# Patient Record
Sex: MALE | Race: WHITE | NOT HISPANIC OR LATINO | URBAN - METROPOLITAN AREA
[De-identification: names, ages, dates, MRNs, and addresses within clinical notes are randomized per-mention and may not be internally consistent; named-entity substitution may affect disease eponyms.]

---

## 2022-08-26 ENCOUNTER — TRANSCRIBE ORDERS (OUTPATIENT)
Dept: PHYSICAL THERAPY | Facility: CLINIC | Age: 77
End: 2022-08-26

## 2022-08-26 DIAGNOSIS — M51.36 DDD (DEGENERATIVE DISC DISEASE), LUMBAR: Primary | ICD-10-CM

## 2022-10-05 ENCOUNTER — TREATMENT (OUTPATIENT)
Dept: PHYSICAL THERAPY | Facility: CLINIC | Age: 77
End: 2022-10-05

## 2022-10-05 DIAGNOSIS — M51.36 DDD (DEGENERATIVE DISC DISEASE), LUMBAR: ICD-10-CM

## 2022-10-05 DIAGNOSIS — G89.29 CHRONIC BILATERAL LOW BACK PAIN WITHOUT SCIATICA: Primary | ICD-10-CM

## 2022-10-05 DIAGNOSIS — Z74.09 IMPAIRED FUNCTIONAL MOBILITY, BALANCE, GAIT, AND ENDURANCE: ICD-10-CM

## 2022-10-05 DIAGNOSIS — M54.50 CHRONIC BILATERAL LOW BACK PAIN WITHOUT SCIATICA: Primary | ICD-10-CM

## 2022-10-05 PROCEDURE — 97162 PT EVAL MOD COMPLEX 30 MIN: CPT | Performed by: PHYSICAL THERAPIST

## 2022-10-05 PROCEDURE — 97110 THERAPEUTIC EXERCISES: CPT | Performed by: PHYSICAL THERAPIST
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
## 2022-10-07 ENCOUNTER — TREATMENT (OUTPATIENT)
Dept: PHYSICAL THERAPY | Facility: CLINIC | Age: 77
End: 2022-10-07

## 2022-10-07 DIAGNOSIS — Z74.09 IMPAIRED FUNCTIONAL MOBILITY, BALANCE, GAIT, AND ENDURANCE: ICD-10-CM

## 2022-10-07 DIAGNOSIS — M51.36 DDD (DEGENERATIVE DISC DISEASE), LUMBAR: ICD-10-CM

## 2022-10-07 DIAGNOSIS — G89.29 CHRONIC BILATERAL LOW BACK PAIN WITHOUT SCIATICA: Primary | ICD-10-CM

## 2022-10-07 DIAGNOSIS — M54.50 CHRONIC BILATERAL LOW BACK PAIN WITHOUT SCIATICA: Primary | ICD-10-CM

## 2022-10-07 PROCEDURE — 97113 AQUATIC THERAPY/EXERCISES: CPT | Performed by: PHYSICAL THERAPIST

## 2022-10-07 NOTE — PROGRESS NOTES
Physical Therapy Daily Treatment Note    Patient: Isauro Sevilla III   : 1945  Diagnosis/ICD-10 Code:  Chronic bilateral low back pain without sciatica [M54.50, G89.29]  Referring practitioner: Sushil Waters MD  Date of Initial Visit: Type: THERAPY  Noted: 10/5/2022  Today's Date: 10/7/2022  Patient seen for 2 sessions             Subjective   I used to walk in the pool for an hour, but haven't been to the pool in a year.    Objective     Water Walk  Deep end X 3 laps Forward and 2 laps Sideways   Tandem Walk across pool in deep end X 2           Stretch Wall Walk alternating knees bent/straight X 1 min.                  Stretch Calf 20 sec                     Stretch --                             Decompression w/ hip/knees bent  X 30 sec.  Abdominals   Large (yellow) foam dumbbell Pushdowns X 15             Shoulder Abd/Add w/ large foam dumbbells X 15                       Hip Abd/Add  15X             Hip Flex/Ext --               March in Place  10X          Sit to Stands X 10 w/o UE assist                B Heel Raises   at 4 ft X 20                    Uni-Clock --                  Step Ups  --                  Bicycle   Seated X 2 min.                                                               Leg press w/ Lg Noodlex 15      Assessment/Plan  First session of aquatic therapy.  Explained expectations from aquatic physical therapy.  Instructed in strengthening and stretching exercises as noted above with verbal cues and demonstration.  No back pain during session.          Timed:  Aquatic Therapy    45     mins 24312;    Francisco Martinez, PT  Physical Therapist    [place] License:  [id number]

## 2022-10-14 ENCOUNTER — TREATMENT (OUTPATIENT)
Dept: PHYSICAL THERAPY | Facility: CLINIC | Age: 77
End: 2022-10-14

## 2022-10-14 DIAGNOSIS — G89.29 CHRONIC BILATERAL LOW BACK PAIN WITHOUT SCIATICA: Primary | ICD-10-CM

## 2022-10-14 DIAGNOSIS — M51.36 DDD (DEGENERATIVE DISC DISEASE), LUMBAR: ICD-10-CM

## 2022-10-14 DIAGNOSIS — M54.50 CHRONIC BILATERAL LOW BACK PAIN WITHOUT SCIATICA: Primary | ICD-10-CM

## 2022-10-14 DIAGNOSIS — Z74.09 IMPAIRED FUNCTIONAL MOBILITY, BALANCE, GAIT, AND ENDURANCE: ICD-10-CM

## 2022-10-14 PROCEDURE — 97113 AQUATIC THERAPY/EXERCISES: CPT | Performed by: PHYSICAL THERAPIST

## 2022-10-14 NOTE — PROGRESS NOTES
Physical Therapy Daily Treatment Note    Patient: Isauro Sevilla III   : 1945  Diagnosis/ICD-10 Code:  Chronic bilateral low back pain without sciatica [M54.50, G89.29]  Referring practitioner: Sushil Waters MD  Date of Initial Visit: Type: THERAPY  Noted: 10/5/2022  Today's Date: 10/14/2022  Patient seen for 3 sessions             Subjective   No problems after first water therapy.    Objective     AQUATIC THERAPY:  Water Walk  From shallow to deep end  Forwards 1 lap and  Sideways 1 lap at end of session  Tandem Walk across pool in deep end X 2  Defer         Stretch Wall Walk alternating knees bent/straight 20 sec                  Stretch Calf 20 sec X 2                                             Decompression w/ hip/knees bent  1 min.  Abdominals   Large (yellow) foam dumbbell Pushdowns X 20             Shoulder Abd/Add w/ large foam dumbbells X20                       Hip Abd/Add  20X             Hip Flex/Ext   15X              March in Place  1 min          Sit to Stands X 12 w/o UE assist                B Heel Raises at 4 ft depth 2  X 20 w/ Calf stretch in between  Rows w/ Hydrotone bells X 20  Hydrotone Stirs  12/12                                Step Ups  --                  Bicycle  --                                                              Leg press w/ Lg foam ring X  20    Assessment/Plan  Reviewed previous exercise with patient and progressed to core stabilization using hydrotone bell resistance as well as hip flexion/extension.  Cues for upright posture and to engage abdominals when appropriate during exercise.  Increased walking challenge by varying depth from shallow to deep.          Timed:  Aquatic Therapy    40     mins 62945;    Francisco Martinez, PT  Physical Therapist    [place] License:  [id number]

## 2022-10-18 ENCOUNTER — TREATMENT (OUTPATIENT)
Dept: PHYSICAL THERAPY | Facility: CLINIC | Age: 77
End: 2022-10-18

## 2022-10-18 DIAGNOSIS — Z74.09 IMPAIRED FUNCTIONAL MOBILITY, BALANCE, GAIT, AND ENDURANCE: ICD-10-CM

## 2022-10-18 DIAGNOSIS — M51.36 DDD (DEGENERATIVE DISC DISEASE), LUMBAR: ICD-10-CM

## 2022-10-18 DIAGNOSIS — G89.29 CHRONIC BILATERAL LOW BACK PAIN WITHOUT SCIATICA: Primary | ICD-10-CM

## 2022-10-18 DIAGNOSIS — M54.50 CHRONIC BILATERAL LOW BACK PAIN WITHOUT SCIATICA: Primary | ICD-10-CM

## 2022-10-18 PROCEDURE — 97113 AQUATIC THERAPY/EXERCISES: CPT | Performed by: PHYSICAL THERAPIST

## 2022-10-18 NOTE — PROGRESS NOTES
Physical Therapy Daily Treatment Note    [facility]  [address] [place] [address]  [phone] (phone)  [phone] (fax)    Patient: Isauro Sevilla III   : 1945  Diagnosis/ICD-10 Code:  Chronic bilateral low back pain without sciatica [M54.50, G89.29]  Referring practitioner: Sushil Waters MD  Date of Initial Visit: Type: THERAPY  Noted: 10/5/2022  Today's Date: 10/18/2022  Patient seen for 4 sessions             Subjective   Patient reports the water has felt very good to him thus far.     Objective     AQUATIC THERAPY:  Water Walk  From shallow to deep end  Forwards 1 lap and  Sideways 1 lap at end of session  Tandem Walk across pool in deep end X 2  Defer         Stretch Wall Walk alternating knees bent/straight 20 sec                  Stretch Calf 20 sec X 2                                             Decompression w/ hip/knees bent  1 min.  Abdominals   Large (yellow) foam dumbbell Pushdowns X 20             Shoulder Abd/Add w/ large foam dumbbells X20                       Hip Abd/Add  20X             Hip Flex/Ext   15X            Hip Circles CW/CCW 10/10    March in Place  1 min   Leg Press Large noodle x20 B         Sit to Stands X 10 w/o UE assist                B Heel Raises *defer  Rows w/ Hydrotone bells X 1 min  Hydrotone Stirs  20/20                                Step Ups  --                  Bicycle  with LN suspended 3 min                 Suspended tuck ups x 20                                          Assessment/Plan  Patient was able to perform UE exercises in deep end of pool without compensation at shoulders. Some excessive activation from hip flexors noted with hip abduction kicks.  Reminded patient to maintain upright posture with all standing hip exercises. Also added bicycle kicks and tuck ups to try and increase core strength/stability         Timed:  Aquatic Therapy    25     mins 37924;  Total Treatment   30    mins    Ania  Kathy PT  Physical Therapist    [place] License: [id number]

## 2022-10-20 ENCOUNTER — TREATMENT (OUTPATIENT)
Dept: PHYSICAL THERAPY | Facility: CLINIC | Age: 77
End: 2022-10-20

## 2022-10-20 DIAGNOSIS — Z74.09 IMPAIRED FUNCTIONAL MOBILITY, BALANCE, GAIT, AND ENDURANCE: ICD-10-CM

## 2022-10-20 DIAGNOSIS — M54.50 CHRONIC BILATERAL LOW BACK PAIN WITHOUT SCIATICA: Primary | ICD-10-CM

## 2022-10-20 DIAGNOSIS — M51.36 DDD (DEGENERATIVE DISC DISEASE), LUMBAR: ICD-10-CM

## 2022-10-20 DIAGNOSIS — G89.29 CHRONIC BILATERAL LOW BACK PAIN WITHOUT SCIATICA: Primary | ICD-10-CM

## 2022-10-20 PROCEDURE — 97113 AQUATIC THERAPY/EXERCISES: CPT | Performed by: PHYSICAL THERAPIST

## 2022-10-20 NOTE — PROGRESS NOTES
Physical Therapy Daily Progress Note    Patient: Isauro Sevilla III   : 1945  Diagnosis/ICD-10 Code:  Chronic bilateral low back pain without sciatica [M54.50, G89.29]  Referring practitioner: Sushil Waters MD  Date of Initial Visit: Type: THERAPY  Noted: 10/5/2022  Today's Date: 10/20/2022  Patient seen for 5 sessions             Subjective Evaluation    History of Present Illness    Subjective comment: I was tired after  session but I'm not complaining, it was a good workout.         Objective     Water Walk shallow to deep end:  Forward, sideways, and marching x 1 lap ea  Tandem Walk across pool in deep end x 2       Stretch Wall Walk alternating knees bent/straight 20 sec - on own prn  Stretch Calf 20 sec x 2  Decompression w/ hip/knees bent  1 min - on own  Abdominals   Large (yellow) foam dumbbell Pushdowns x 20  Shoulder Abd/Add w/ large foam dumbbells x 20  Hip Abd/Add  20x  Hip Flex/Ext   15x  Hip Circles CW/CCW 15/15  March in Place  1 min - *deferred  Leg Press Large noodle x 25 B  Sit to Stands x 10 w/o UE assist  B Heel Raises *defer  Rows w/ Hydrotone bells x 1 min  Hydrotone Stirs  20/20  Step Ups  --  Bicycle  with LN suspended 3 min - on own  Suspended tuck ups x 20        Assessment & Plan     Assessment    Assessment details: Patient reports being tired after last session but felt it was a good thing.  Contrinued with previous aquatic ex/activity for mobility, flexibility, strength/stabilization, and balance.  Increased reps on a couple of ex and added march walking this visit.  Frequent reminders to stand tall and engage abdominals during water walking, standing LE ex. Instructed to reduce ROM and focus on controlled movement with hip kicks to minimize trunk compensation.  PT provided demonstration and cuing throughout session for optimal posture, core/glut activation, and correct form/technique with ex/activity.    Plan: Continue to aAssess patient response to aquatic  ex/activity and modify/progress as appropriate.                  Timed:  Aquatic Therapy    32     mins [id number];    Gisela Craig, PT  Physical Therapist    [place] License: [id number]

## 2022-10-24 ENCOUNTER — TREATMENT (OUTPATIENT)
Dept: PHYSICAL THERAPY | Facility: CLINIC | Age: 77
End: 2022-10-24

## 2022-10-24 DIAGNOSIS — M54.50 CHRONIC BILATERAL LOW BACK PAIN WITHOUT SCIATICA: Primary | ICD-10-CM

## 2022-10-24 DIAGNOSIS — M51.36 DDD (DEGENERATIVE DISC DISEASE), LUMBAR: ICD-10-CM

## 2022-10-24 DIAGNOSIS — Z74.09 IMPAIRED FUNCTIONAL MOBILITY, BALANCE, GAIT, AND ENDURANCE: ICD-10-CM

## 2022-10-24 DIAGNOSIS — G89.29 CHRONIC BILATERAL LOW BACK PAIN WITHOUT SCIATICA: Primary | ICD-10-CM

## 2022-10-24 PROCEDURE — 97113 AQUATIC THERAPY/EXERCISES: CPT | Performed by: PHYSICAL THERAPIST

## 2022-10-24 NOTE — PROGRESS NOTES
Physical Therapy Daily Progress Note    Patient: Isauro Sevilla III   : 1945  Diagnosis/ICD-10 Code:  Chronic bilateral low back pain without sciatica [M54.50, G89.29]  Referring practitioner: Sushil Waters MD  Date of Initial Visit: Type: THERAPY  Noted: 10/5/2022  Today's Date: 10/24/2022  Patient seen for 6 sessions             Subjective Evaluation    History of Present Illness    Subjective comment: I was kind of tired after last time but seemed llike I got a good workout.  My back's been doing okay but it was a little more sore after last visit -, thinking maybe I overstretched.         Objective     Water Walk shallow to deep end:  Forward, sideways, and marching x 2 laps ea, emphasis on upright posture (noodle support for march walking)  Tandem Walk across pool in deep end x 2, noodle support       Stretch Wall Walk alternating knees bent/straight 20 sec - on own prn  Stretch Calf 20 sec x 2  Decompression w/ hip/knees bent  1 min - on own  Abdominals   Large (yellow) foam dumbbell Pushdowns x 20  Shoulder Abd/Add w/ large foam dumbbells x 20  Rows w/ Hydrotone bells x 1 min  Hydrotone Stirs  20/20  KB push/pulls x 20  Mini Squats x 15  Hip Abd/Add  20x  Hip Flex/Ext   20x  Hip Circles CW/CCW 15/15  March in Place  1 min - *deferred  Leg Press Large noodle x 25 B  B Heel Raises x 20  Step Ups  --  Bicycle  with LN suspended 3 min  Suspended tuck ups x 20      Sit to Stands x 10 w/o UE assist    Assessment & Plan     Assessment    Assessment details: Patient reports being a little tired after last session but believes it was a good workout.  Contrinued with most previous aquatic ex/activity for mobility, flexibility, strength/stabilization, and balance.  Increased reps on a couple of ex and added mini squats, KB push/pulls this visit.  Emphasis on maintaining upright posture and not slouching during WB ex/activity.  Encouraged him to focus on engaging core and keeping ex performance in comfortable  range to help minimize strain on spine.  He did use noodle support for march/tandem walking to help with balance/stability.  Demonstration and cuing provided throughout session for optimal posture, core/glut activation, and correct form/technique with ex/activity.    Plan: Continue with aquatic ex/activity progressing as appropriate/tolerated.                 Timed:  Aquatic Therapy    39     mins 22862;    Gisela Craig PT  Physical Therapist    [place] License: [id number]

## 2022-11-04 ENCOUNTER — TREATMENT (OUTPATIENT)
Dept: PHYSICAL THERAPY | Facility: CLINIC | Age: 77
End: 2022-11-04

## 2022-11-04 DIAGNOSIS — Z74.09 IMPAIRED FUNCTIONAL MOBILITY, BALANCE, GAIT, AND ENDURANCE: ICD-10-CM

## 2022-11-04 DIAGNOSIS — M51.36 DDD (DEGENERATIVE DISC DISEASE), LUMBAR: ICD-10-CM

## 2022-11-04 DIAGNOSIS — G89.29 CHRONIC BILATERAL LOW BACK PAIN WITHOUT SCIATICA: Primary | ICD-10-CM

## 2022-11-04 DIAGNOSIS — M54.50 CHRONIC BILATERAL LOW BACK PAIN WITHOUT SCIATICA: Primary | ICD-10-CM

## 2022-11-04 PROCEDURE — 97113 AQUATIC THERAPY/EXERCISES: CPT | Performed by: PHYSICAL THERAPIST

## 2022-11-04 NOTE — PROGRESS NOTES
"Physical Therapy 30-Day Progress Note     [facility]  [address] [place] [address]  [phone] (phone)  [phone] (fax)    Patient: Isauro Sevilla III   : 1945  Diagnosis/ICD-10 Code:  Chronic bilateral low back pain without sciatica [M54.50, G89.29]  Referring practitioner: Sushil Waters MD  Date of Initial Visit: Type: THERAPY  Noted: 10/5/2022  Today's Date: 2022  Patient seen for 7 sessions      Subjective:     Clinical Progress: improved  Home Program Compliance: Yes  Treatment has included:  aquatic therapy and patient education with home exercise program     Subjective  Pain  Current  3 /10, Peak pain /10  Getting out of bed \"wrong\".   Standing and walking aggravate his back pain.  I've been working on weight management and lost 20 pounds.      Objective       Functional outcome score: Oswestry 58%    Strength/Myotome Testing      Left Hip   Planes of Motion   Flexion  5/5  External rotation: 5/5     Right Hip   Planes of Motion   Flexion: 5/5  External rotation: 5/5     Left Knee   Flexion: 5  Extension: 4     Right Knee   Flexion: 4  Extension: 4     Left Ankle/Foot   Dorsiflexion: 5  Plantar flexion: 4     Right Ankle/Foot   Dorsiflexion: 5  Plantar flexion: 4    AQUATIC EXERCISE:    Water Walk shallow to deep end:  Forward, sideways and marching x 2 laps ea,  (noodle support for march walking)  Tandem Walk across pool in deep end x 2, noodle support  Deferred     Stretch Wall Walk alternating knees bent/straight 20 sec X 2  Stretch Calf 20 sec x 2  Abdominals   Large (yellow) foam dumbbell Pushdowns x 20  Shoulder Abd/Add w/ large foam dumbbells x 20  Rows w/ Hydrotone bells x 15  Hydrotone Stirs  10X  KB push/pulls x 20  Deferred  Mini Squats x 15  Hip Abd/Add  20x  Hip Flex/Ext   20x  Deferred  Hip Circles CW/CCW 15/15  Deferred  Leg Press Large noodle x 20   B Heel Raises x 20  Step Ups  8 inch  X 10  Bicycle  with LN suspended 3 " "min   Sit to Stands x 10 w/o UE assist    Education in bed mobility with demonstration.    Assessment/Plan        Isauro Sevilla III is a 77 y.o. year-old male referred to physical therapy for back pain  He has comorbidities of DDD, L4 disc bulge, R knee pain.  \"Chon\" has been treated in aquatic therapy for 6 sessions today.  He missed an appointment last week due to seeing the Dr for breathing issues related to his COPD.  His LE strength is improving and his pain ratings are decreasing.  He continues to have difficulty with standing and walking due to back pain and weakness.  Chon has been working on weight management and has lost 20 pounds.  He has met all the short term goals as well as one of five long term goals.  Chon is motivated to improve and appropriate for continued skilled physical therapy.     Goals: STGs:   1. Patient will be independent with education for symptom management, joint protection and strategies to minimize stress on affected tissues. MET    2. Pt is able to tolerate 30 min of aquatic therapy with reports of reduced pain levels after treatment for 2-3 hours.  MET    3. Pt to improve pain complaints to no more than 6/10 with ADLs and walking.  MET    LT wks  1. Pt to report no more than 4/10 pain in the LB with ADLs and walking.  ONGOING  2. Pt to improve trunk and LE strength by 1/2 to 1 MMT grade. PARTIALLY MET  3. Pt able to exit the pool on the stairs using a reciprocal pattern.  MET  4. Pt to improve Oswestry Back index score from 60% to 46% for overall functional improvement. ONGOING 58%  5. Pt to be independent with progressive HEP for core and LE strength. ONGOING           Recommendations: Continue as planned  Timeframe: 2 months  Prognosis to achieve goals: good    PT Signature: Francisco Martinez, PT  [place] License: [id number]      Based upon review of the patient's progress and continued therapy plan, it is my medical opinion that Isauro Sevilla III should continue physical therapy " treatment at [facility]AP[facility]  [address] [place] [id number] [phone].    Signature: __________________________________  Sushil Waters  NPI: [id number]                                          Timed:  Aquatic therapy  48517    45   mins

## 2022-11-09 ENCOUNTER — TREATMENT (OUTPATIENT)
Dept: PHYSICAL THERAPY | Facility: CLINIC | Age: 77
End: 2022-11-09

## 2022-11-09 DIAGNOSIS — G89.29 CHRONIC BILATERAL LOW BACK PAIN WITHOUT SCIATICA: Primary | ICD-10-CM

## 2022-11-09 DIAGNOSIS — M51.36 DDD (DEGENERATIVE DISC DISEASE), LUMBAR: ICD-10-CM

## 2022-11-09 DIAGNOSIS — Z74.09 IMPAIRED FUNCTIONAL MOBILITY, BALANCE, GAIT, AND ENDURANCE: ICD-10-CM

## 2022-11-09 DIAGNOSIS — M54.50 CHRONIC BILATERAL LOW BACK PAIN WITHOUT SCIATICA: Primary | ICD-10-CM

## 2022-11-09 PROCEDURE — 97113 AQUATIC THERAPY/EXERCISES: CPT | Performed by: PHYSICAL THERAPIST

## 2022-11-09 NOTE — PROGRESS NOTES
Physical Therapy Daily Treatment Note    [facility] Physical Therapy Milestone  [address] [place] [address]  [phone] (phone)  [phone] (fax)    Patient: Isauro Sevilla III   : 1945  Diagnosis/ICD-10 Code:  Chronic bilateral low back pain without sciatica [M54.50, G89.29]  Referring practitioner: Sushil Waters MD  Date of Initial Visit: Type: THERAPY  Noted: 10/5/2022  Today's Date: 2022  Patient seen for 8 sessions             Subjective   My back was doing okay until I got up this morning.  Feeling some better now.    Objective     AQUATIC EXERCISE:   Water Walk shallow to deep end:  Forwards and sideways Independent  March Walk w/ Noodle support  Deferred  Tandem Walk across pool in deep end w/ noodle support 15 ft X 4, then w/o noodle 15 ft X 2     Stretch Wall Walk alternating knees bent/straight 20 sec X 2  Stretch Calf 20 sec x 2  Abdominals   Large (yellow) foam dumbbell Pushdowns x 20  Shoulder Abd/Add w/ large foam dumbbells x 20  Rows w/ Hydrotone bells x 15  Hydrotone Stirs  15/15  Mini Squats x 15  Hip Abd/Add  20x  Hip Flex/Ext   20x    Hip Circles CW/CCW 15/15    Leg Press Solid (white) noodle x 20 Deep  B Heel Raises 2 X 10 shallow  Step Ups  8 inch  X 10  Deferred  Bicycle  with LN suspended 3 min Independent  Sit to Stands x 10 w/o UE assist      Assessment/Plan  Used written instructions with pictures of aquatic exercises this visit to assist with patient becoming more independent as he would like to start coming to the pool 1 day a week on his own to supplement his therapy.  He is still concerned about his balance with walking and continues to report intermittent back pain that also limits his ability to walk longer distances.          Timed:  Aquatic Therapy    40     mins [id number];    Francisco Martinez, PT  Physical Therapist    [place] License:  [id number]

## 2022-11-11 ENCOUNTER — TREATMENT (OUTPATIENT)
Dept: PHYSICAL THERAPY | Facility: CLINIC | Age: 77
End: 2022-11-11

## 2022-11-11 DIAGNOSIS — M51.36 DDD (DEGENERATIVE DISC DISEASE), LUMBAR: ICD-10-CM

## 2022-11-11 DIAGNOSIS — G89.29 CHRONIC BILATERAL LOW BACK PAIN WITHOUT SCIATICA: Primary | ICD-10-CM

## 2022-11-11 DIAGNOSIS — Z74.09 IMPAIRED FUNCTIONAL MOBILITY, BALANCE, GAIT, AND ENDURANCE: ICD-10-CM

## 2022-11-11 DIAGNOSIS — M54.50 CHRONIC BILATERAL LOW BACK PAIN WITHOUT SCIATICA: Primary | ICD-10-CM

## 2022-11-11 PROCEDURE — 97113 AQUATIC THERAPY/EXERCISES: CPT | Performed by: PHYSICAL THERAPIST

## 2022-11-11 NOTE — PROGRESS NOTES
Physical Therapy Daily Treatment Note    [facility]  [address] [place] [address]  [phone] (phone)  [phone] (fax)    Patient: Isauro Sevilla III   : 1945  Diagnosis/ICD-10 Code:  Chronic bilateral low back pain without sciatica [M54.50, G89.29]  Referring practitioner: Sushil Waters MD  Date of Initial Visit: Type: THERAPY  Noted: 10/5/2022  Today's Date: 2022  Patient seen for 9 sessions             Subjective   I had that one bad day with my back.    Objective     AQUATIC EXERCISE:  Water walking Forwards - Independent  March Walk w/o Noodle support  25 ft X 4  Tandem Walk at 4 ft depth w/o noodle 25 ft X 4  Stretch Wall Walk alternating knees bent/straight 20 sec X 2  Stretch Calf 20 sec x 2  Defer  Abdominals   Large (yellow) foam dumbbell Pushdowns x 20  Shoulder Abd/Add w/ large foam dumbbells x 20  Rows w/ Hydrotone bells x 15  Hydrotone Stirs  15/15   Defer  Mini Squats x 15  Defer  Hip Abd/Add  20x  Defer  Hip Flex/Ext   20x  Defer  Hip Circles CW/CCW 15/15  Defer  Leg Press Solid (white) noodle x 20 Deep Defer  B Heel Raises 2 X 10 shallow  Defer  Step Ups  8 inch  X 10    Bicycle  with LN suspended 3 min Independent  Sit to Stands x 10 w/o UE assist Defer    Assessment/Plan  Patient arrived at 3:45 for a 3:15 appointment - got times mixed up.  Able to accommodate for a shortened session, therefore many exercises were deferred.  He reports his back is feeling better.  He demonstrates improved balance with tandem walk and march walk - now able to perform without noodle assist and in shallower depth than previously.            Timed:  Aquatic Therapy    12     mins [id number];    Francisco Martinez, PT  Physical Therapist    [place] License:  [id number]

## 2022-11-16 ENCOUNTER — TREATMENT (OUTPATIENT)
Dept: PHYSICAL THERAPY | Facility: CLINIC | Age: 77
End: 2022-11-16

## 2022-11-16 DIAGNOSIS — G89.29 CHRONIC BILATERAL LOW BACK PAIN WITHOUT SCIATICA: Primary | ICD-10-CM

## 2022-11-16 DIAGNOSIS — M51.36 DDD (DEGENERATIVE DISC DISEASE), LUMBAR: ICD-10-CM

## 2022-11-16 DIAGNOSIS — M54.50 CHRONIC BILATERAL LOW BACK PAIN WITHOUT SCIATICA: Primary | ICD-10-CM

## 2022-11-16 DIAGNOSIS — Z74.09 IMPAIRED FUNCTIONAL MOBILITY, BALANCE, GAIT, AND ENDURANCE: ICD-10-CM

## 2022-11-16 PROCEDURE — 97113 AQUATIC THERAPY/EXERCISES: CPT | Performed by: PHYSICAL THERAPIST

## 2022-11-16 NOTE — PROGRESS NOTES
Physical Therapy Daily Treatment Note    [facility] Physical Therapy Milestone  [address] [place] [address]  [phone] (phone)  [phone] (fax)    Patient: Isauro Sevilla III   : 1945  Diagnosis/ICD-10 Code:  Chronic bilateral low back pain without sciatica [M54.50, G89.29]  Referring practitioner: Sushil Waters MD  Date of Initial Visit: Type: THERAPY  Noted: 10/5/2022  Today's Date: 2022  Patient seen for 10 sessions             Subjective   I don't sleep well and haven't for years, had a recent sleep apnea test and I don't have sleep apnea.  I have trouble getting to sleep and then when sleeping I get woke up when I turn over from pain.    Objective     AQUATIC EXERCISE:  Water walking Forwards - Independent  March Walk   25 ft X 2  Tandem Walk at 4 ft depth w/o noodle 25 ft X 4  Stretch Wall Walk alternating knees bent/straight 20 sec X 2  Stretch Calf 20 sec x 2    Abdominals   Large (yellow) foam dumbbell Pushdowns x 20  Shoulder Abd/Add w/ large foam dumbbells x 20  Rows w/ Hydrotone bells x 15  Hydrotone Stirs  15/15    Mini Squats x 15    Hip Abd/Add  20x    Hip Flex/Ext   20X    Hip Circles CW/CCW 15/15   Leg Press Solid (white) noodle x 20 Deep   B Heel Raises 2 X 10 shallow   Step Ups  8 inch  X 10    Bicycle  with LN suspended Independent         Assessment/Plan  Patient reports jarring in his left LE when walking.  He uses a cane in his left hand.  He was advised to use the cane in his right hand to offset weight bearing on the left. Demonstrated bed mobility as patient reports left shoulder pain getting in/out.        Timed:  Aquatic Therapy    45     mins [id number];    Francisco Martinez, PT  Physical Therapist    [place] License:  [id number]

## 2022-11-18 ENCOUNTER — TREATMENT (OUTPATIENT)
Dept: PHYSICAL THERAPY | Facility: CLINIC | Age: 77
End: 2022-11-18

## 2022-11-18 DIAGNOSIS — Z74.09 IMPAIRED FUNCTIONAL MOBILITY, BALANCE, GAIT, AND ENDURANCE: ICD-10-CM

## 2022-11-18 DIAGNOSIS — M51.36 DDD (DEGENERATIVE DISC DISEASE), LUMBAR: ICD-10-CM

## 2022-11-18 DIAGNOSIS — M54.50 CHRONIC BILATERAL LOW BACK PAIN WITHOUT SCIATICA: Primary | ICD-10-CM

## 2022-11-18 DIAGNOSIS — G89.29 CHRONIC BILATERAL LOW BACK PAIN WITHOUT SCIATICA: Primary | ICD-10-CM

## 2022-11-18 PROCEDURE — 97113 AQUATIC THERAPY/EXERCISES: CPT | Performed by: PHYSICAL THERAPIST

## 2022-11-18 NOTE — PROGRESS NOTES
Physical Therapy Daily Treatment Note    [facility] Physical Therapy Milestone  [address] [place] [address]  [phone] (phone)  [phone] (fax)    Patient: Isauro Sevilla III   : 1945  Diagnosis/ICD-10 Code:  Chronic bilateral low back pain without sciatica [M54.50, G89.29]  Referring practitioner: Sushil Waters MD  Date of Initial Visit: Type: THERAPY  Noted: 10/5/2022  Today's Date: 2022  Patient seen for 11 sessions             Subjective   My back is feeling better, I still go to the chiropractor every other week.  Having trouble sleeping the last 2 months.    Objective     AQUATIC EXERCISE:  Water walking Forwards - Independent prior to appt.  March Walk   25 ft X 2  Tandem Walk at 4 ft depth w/o noodle 25 ft X 4- Deferred  Stretch Wall Walk alternating knees bent/straight 20 sec X 2  Stretch Calf 20 sec x 2    Abdominals   Large (yellow) foam dumbbell Pushdowns x 20 -Try blue next  Shoulder Abd/Add w/ large foam dumbbells x 20 - Try blue next  Rows w/ Hydrotone bells x 15  Hydrotone Stirs  15/15    Mini Squats x 15    Hip Abd/Add  20x    Hip Flex/Ext   20X    Hip Circles CW/CCW 15/15   Leg Press Solid (white) noodle x 20 Deep   B Heel Raises 2 X 10 shallow   Step Ups  8 inch  X 10    Bicycle  with LN suspended- Independent    Assessment/Plan  Recommended patient consult his PCP regarding sleep problems.  Urged patient to exercise 1 day/week at the pool on his own in addition to physical therapy 2 days/week.   Needs correction on proper technique with a few exercises, guidance for optimal water depth for each exercise.  Plan: Progress to larger blue foam dumbbells to increase resistance next visit.        Timed:  Aquatic Therapy    39     mins 05150;    Francisco Martinez, PT  Physical Therapist    [place] License:  [id number]

## 2022-11-21 ENCOUNTER — TREATMENT (OUTPATIENT)
Dept: PHYSICAL THERAPY | Facility: CLINIC | Age: 77
End: 2022-11-21

## 2022-11-21 DIAGNOSIS — Z74.09 IMPAIRED FUNCTIONAL MOBILITY, BALANCE, GAIT, AND ENDURANCE: ICD-10-CM

## 2022-11-21 DIAGNOSIS — M51.36 DDD (DEGENERATIVE DISC DISEASE), LUMBAR: ICD-10-CM

## 2022-11-21 DIAGNOSIS — G89.29 CHRONIC BILATERAL LOW BACK PAIN WITHOUT SCIATICA: Primary | ICD-10-CM

## 2022-11-21 DIAGNOSIS — M54.50 CHRONIC BILATERAL LOW BACK PAIN WITHOUT SCIATICA: Primary | ICD-10-CM

## 2022-11-21 PROCEDURE — 97113 AQUATIC THERAPY/EXERCISES: CPT | Performed by: PHYSICAL THERAPIST

## 2022-11-21 NOTE — PROGRESS NOTES
"Physical Therapy Daily Treatment Note    [facility] Physical Therapy Milestone  [address] [place] [address]  [phone] (phone)  [phone] (fax)    Patient: Isauro Sevilla III   : 1945  Diagnosis/ICD-10 Code:  Chronic bilateral low back pain without sciatica [M54.50, G89.29]  Referring practitioner: Sushil Waters MD  Date of Initial Visit: Type: THERAPY  Noted: 10/5/2022  Today's Date: 2022  Patient seen for 12 sessions             Subjective   Going to see my asthma Dr tomorrow to see about my breathing when I'm trying to sleep.      Objective     AQUATIC EXERCISE:  Water walking Forwards - Independent prior to appt.  March Walk   25 ft X 4  Tandem Walk at 4 ft depth w/o noodle 25 ft X 4  Stretch Wall Walk alternating knees bent/straight 20 sec X 2  Stretch Calf 20 sec x 2    Abdominals Xtra  Large (blue) foam dumbbell Pushdowns x 20 (4 ft)  Shoulder Abd/Add w/ Xtra Large foam dumbbells x 20 (4 ft)  Rows w/ closed paddles x 15  Paddle Stirs  15/15    Sit to Stand from pool bench seat X 10 w/o hands  Uni Clock X 10  Shallow  Mini Squats x 15    Hip Abd/Add  20x    Hip Flex/Ext   20X    Hip Circles CW/CCW 15/15   Leg Press Solid (white) noodle x 20 Deep   B Heel Raises 20X shallow   Step Ups  8 inch  X 10  Deferred  Bicycle  with LN suspended- Independent      Assessment/Plan  Due to left shoulder pain reduced resistance with Rows and \"Stir the pot\" from larger hydrotone bells to smaller paddles.  Patient was able to perform sit to stands from pool bench without hand support with greater ease than a month ago.  He was also able to increase water walking to 6 minutes without break.          Timed:  Aquatic Therapy    38     mins [id number];    Francisco Martinez, PT  Physical Therapist    [place] License:  [id number]  "

## 2022-11-23 ENCOUNTER — TREATMENT (OUTPATIENT)
Dept: PHYSICAL THERAPY | Facility: CLINIC | Age: 77
End: 2022-11-23

## 2022-11-23 DIAGNOSIS — G89.29 CHRONIC BILATERAL LOW BACK PAIN WITHOUT SCIATICA: Primary | ICD-10-CM

## 2022-11-23 DIAGNOSIS — M54.50 CHRONIC BILATERAL LOW BACK PAIN WITHOUT SCIATICA: Primary | ICD-10-CM

## 2022-11-23 DIAGNOSIS — M51.36 DDD (DEGENERATIVE DISC DISEASE), LUMBAR: ICD-10-CM

## 2022-11-23 DIAGNOSIS — Z74.09 IMPAIRED FUNCTIONAL MOBILITY, BALANCE, GAIT, AND ENDURANCE: ICD-10-CM

## 2022-11-23 PROCEDURE — 97113 AQUATIC THERAPY/EXERCISES: CPT | Performed by: PHYSICAL THERAPIST

## 2022-11-23 NOTE — PROGRESS NOTES
Physical Therapy Daily Treatment Note    [facility] Physical Therapy Milestone  [address] [place] [address]  [phone] (phone)  [phone] (fax)    Patient: Isauro Sevilla III   : 1945  Diagnosis/ICD-10 Code:  Chronic bilateral low back pain without sciatica [M54.50, G89.29]  Referring practitioner: Sushil Waters MD  Date of Initial Visit: Type: THERAPY  Noted: 10/5/2022  Today's Date: 2022  Patient seen for 13 sessions             Subjective   Sleeping better, Dr reduced some of his OTC medicines and the inhaler. Slept 7 hours last night.    Objective     AQUATIC EXERCISE:  Water walking Forwards - Independent prior to appt.  Tandem Walk at 4 ft depth 25 ft X 4  Stretch Wall Walk alternating knees bent/straight 20 sec X 2  Stretch Calf 20 sec x 2    Abdominals Xtra  Large (blue) foam dumbbell Pushdowns x 20 (4 ft)  Shoulder Abd/Add w/ Xtra Large foam dumbbells x 20 (4 ft)  Rows w/ closed paddles x 15  Paddle Stirs  15/15    Sit to Stand from pool bench seat X 10 w/o hands  Uni Clock X 10  Shallow  Defer  Mini Squats x 15    Hip Abd/Add  20x  On own  Hip Flex/Ext   20X    Hip Circles CW/CCW 15/15   B Heel Raises 20X shallow   Step Ups  6 inch  X 15  Piriformis stretch 20 sec X 2  Leg Press Solid (white) noodle x 20 Deep on own  Bicycle  with LN suspended- Independent         Assessment/Plan  Changed from deeper water 8 inch step up to shallow water 6 inch step up, Chon has tendency to pull self up with his hand on the railing, cues to use rail for balance.  Demonstration provided for sit to stand transfer technique on dry land as Chon has difficulty with balance upon standing.        Timed:  Aquatic Therapy    30     mins 69901;    Francisco Martinez, PT  Physical Therapist    [place] License:  [id number]

## 2022-11-28 ENCOUNTER — TREATMENT (OUTPATIENT)
Dept: PHYSICAL THERAPY | Facility: CLINIC | Age: 77
End: 2022-11-28

## 2022-11-28 DIAGNOSIS — M54.50 CHRONIC BILATERAL LOW BACK PAIN WITHOUT SCIATICA: Primary | ICD-10-CM

## 2022-11-28 DIAGNOSIS — Z74.09 IMPAIRED FUNCTIONAL MOBILITY, BALANCE, GAIT, AND ENDURANCE: ICD-10-CM

## 2022-11-28 DIAGNOSIS — G89.29 CHRONIC BILATERAL LOW BACK PAIN WITHOUT SCIATICA: Primary | ICD-10-CM

## 2022-11-28 DIAGNOSIS — M51.36 DDD (DEGENERATIVE DISC DISEASE), LUMBAR: ICD-10-CM

## 2022-11-28 PROCEDURE — 97113 AQUATIC THERAPY/EXERCISES: CPT | Performed by: PHYSICAL THERAPIST

## 2022-11-28 NOTE — PROGRESS NOTES
Physical Therapy Daily Treatment Note    [facility] Physical Therapy Milestone  [address] [place] [address]  [phone] (phone)  [phone] (fax)    Patient: Isauro Sevilla III   : 1945  Diagnosis/ICD-10 Code:  Chronic bilateral low back pain without sciatica [M54.50, G89.29]  Referring practitioner: Sushil Waters MD  Date of Initial Visit: Type: THERAPY  Noted: 10/5/2022  Today's Date: 2022  Patient seen for 14 sessions             Subjective   Back to having trouble getting to sleep and trouble with breathing while sleeping - having a sleep apnea test. Came to the pool  on my own and did too much and that made my back hurt.    Objective     AQUATIC EXERCISE:  Water walking Forwards - Independent prior to appt.  Tandem Walk at 4 ft depth 25 ft X 4  Stretch Wall Walk alternating knees bent/straight 20 sec X 2  Stretch Calf 20 sec x 2    Abdominals  Large (yellow) foam dumbbell Pushdowns x 20   Shoulder Abd/Add w/ Large foam dumbbells x 20   Rows w/ closed paddles x 15  Paddle Stirs  --   Sit to Stand from pool bench seat X 10 w/o hands Defer  Uni Clock X 10  Shallow  Defer  Mini Squats x 15    Hip Abd/Add  20x   Hip Flex/Ext   20X    Hip Circles CW/CCW 15/15   B Heel Raises 20X shallow   Step Ups  8 inch  X 15 at waist depth  Piriformis stretch 20 sec X 2  Leg Press Solid (white) noodle x 20 Deep   Bicycle  with LN suspended- Independent    Assessment/Plan  Patient came to pool on his own yesterday and doubled the exercises which resulted in increased back pain.  Education on dosage of exercise.  Patient is being scheduled for sleep apnea test. - Having trouble with sleeping again.  He opted to go back to the yellow foam dumbbells and the smaller blue paddles for core work due to left shoulder irritation.  Progressed from 6 inch  to 8 inch step ups.          Timed:  Aquatic Therapy    40     mins 22160;    Francisco Martinez, PT  Physical Therapist    [place] License:   [id number]

## 2022-11-30 ENCOUNTER — TREATMENT (OUTPATIENT)
Dept: PHYSICAL THERAPY | Facility: CLINIC | Age: 77
End: 2022-11-30

## 2022-11-30 DIAGNOSIS — M54.50 CHRONIC BILATERAL LOW BACK PAIN WITHOUT SCIATICA: Primary | ICD-10-CM

## 2022-11-30 DIAGNOSIS — G89.29 CHRONIC BILATERAL LOW BACK PAIN WITHOUT SCIATICA: Primary | ICD-10-CM

## 2022-11-30 DIAGNOSIS — M51.36 DDD (DEGENERATIVE DISC DISEASE), LUMBAR: ICD-10-CM

## 2022-11-30 DIAGNOSIS — Z74.09 IMPAIRED FUNCTIONAL MOBILITY, BALANCE, GAIT, AND ENDURANCE: ICD-10-CM

## 2022-11-30 PROCEDURE — 97113 AQUATIC THERAPY/EXERCISES: CPT | Performed by: PHYSICAL THERAPIST

## 2022-11-30 NOTE — PROGRESS NOTES
Physical Therapy Daily Treatment Note    [facility] Physical Therapy Milestone  [address] [place], [place] [address] [phone] (phone)  [phone] (fax)    Patient: Isauro Sevilla III   : 1945  Diagnosis/ICD-10 Code:  Chronic bilateral low back pain without sciatica [M54.50, G89.29]  Referring practitioner: Sushil Waters MD  Date of Initial Visit: Type: THERAPY  Noted: 10/5/2022  Today's Date: 2022  Patient seen for 15 sessions             Subjective   My balance is getting better, but I still need my cane.    Objective     AQUATIC THERAPY:  Water walk: moved to just below 4 ft depth, 25 ft X 4, cues to avoid forward lean and add reciprocal arm swing  March Walk 25 ft X 2  Tandem Walk with Noodle support 25 ft X 2, w/o Noodle support 25 ft X4, cues to look at focal point at eye level and slow down  Leg Press w/ solid (white) Noodle 2 X 10 each  Hydrorider (water bike) X 2 minutes, discontinued as patient not comfortable  Scapular Retraction - 5 sec Hold X 10  Overhead Reach X 8  8 inch Step Ups X 15  Mini Squats X 15  Heel Raises X 20  Sit to Stands X 10  Suspended on noodle -bicycle leg motion X 2 min. w/ core activation  Uni-Clock X 10      Assessment/Plan  Patient notes improved balance, however still requires cane for safety.  He would like to try using the recumbent bike prior to therapy next time, he was not comfortable on the Hydrorider (water bike).  He was advised to wear water shoes to address arch pain during therapy and that has helped.          Timed:  Aquatic Therapy    42     mins 16559;    Francisco Martinez, PT  Physical Therapist    [place] License:  [id number]

## 2022-12-05 ENCOUNTER — TREATMENT (OUTPATIENT)
Dept: PHYSICAL THERAPY | Facility: CLINIC | Age: 77
End: 2022-12-05

## 2022-12-05 DIAGNOSIS — M51.36 DDD (DEGENERATIVE DISC DISEASE), LUMBAR: ICD-10-CM

## 2022-12-05 DIAGNOSIS — G89.29 CHRONIC BILATERAL LOW BACK PAIN WITHOUT SCIATICA: Primary | ICD-10-CM

## 2022-12-05 DIAGNOSIS — Z74.09 IMPAIRED FUNCTIONAL MOBILITY, BALANCE, GAIT, AND ENDURANCE: ICD-10-CM

## 2022-12-05 DIAGNOSIS — M54.50 CHRONIC BILATERAL LOW BACK PAIN WITHOUT SCIATICA: Primary | ICD-10-CM

## 2022-12-05 PROCEDURE — 97113 AQUATIC THERAPY/EXERCISES: CPT | Performed by: PHYSICAL THERAPIST

## 2022-12-05 NOTE — PROGRESS NOTES
Physical Therapy 60-Day Progress Note     [facility] Physical Therapy Milestone  [address] [place] [address]  [phone] (phone)  [phone] (fax)    Patient: Isauro Sevilla III   : 1945  Diagnosis/ICD-10 Code:  Chronic bilateral low back pain without sciatica [M54.50, G89.29]  Referring practitioner: Sushil Waters MD  Date of Initial Visit: Type: THERAPY  Noted: 10/5/2022  Today's Date: 2022  Patient seen for 16 sessions      Subjective:     Clinical Progress: improved  Home Program Compliance: N/A  Treatment has included:  aquatic therapy and patient education with home exercise program     Subjective Pain 4/10, At Best   3/10,  Peak Pain  6/10  Location across Low back.  Aggravating Factors-Getting into car (twisting), carrying gym bag on shoulder, and standing    Objective      Strength/Myotome Testing      Left Hip   Planes of Motion   Flexion  5/5  External rotation: 5/5     Right Hip   Planes of Motion   Flexion: 5/5  External rotation: 5/5     Left Knee   Flexion: 5  Extension: 4+     Right Knee   Flexion: 5  Extension: 4+     Left Ankle/Foot   Dorsiflexion: 5  Plantar flexion: Unable to perform heel raise     Right Ankle/Foot   Dorsiflexion: 5  Plantar flexion: Unable to perform heel raise    Trunk Flexion AROM 75%    Moderate hamstring tightness    Gait: Uses cane, gait speed improved, lacks full knee extension at heel strike, decreased step length.    Walking limited by knee pain (history of 7 knee surgeries)    Functional outcome score: Oswestry  42%    Education: Reviewed car transfers verbally and with demonstration using a bench type seat.    AQUATIC THERAPY:  Water walk: Independent, discussed patient walking in lap pool (cooler temp and 25 yard length)  March Walk Defer  Hamstring Stretch  20 sec X 2  Calf Stretch  20 sec X 2  Rows w/ Hydrotones X 15  Tandem Walk with Noodle support 25 ft X 2, w/o Noodle support 25 ft X4, cues to look at focal point at  "eye level and slow down  Leg Press w/ solid (white) Noodle 2 X 10 each  8 inch Step Ups X 15  Hip Abduction  20X  Mini Squats X 15  Heel Raises X 20  Sit to Stands X 10  Step Ups 6 inch X 10  Suspended on noodle -bicycle leg motion X 2 min. w/ core activation  Uni-Clock X 10      Assessment/Plan     Isauro Sevilla III is a 77 y.o. year-old male referred to physical therapy for back pain  He has comorbidities of lumbar DDD, L4 disc bulge, and R knee pain (history of multiple surgeries).  \"Chon\" has been treated in aquatic therapy for 15 sessions.   There is notable improvement in his functional outcome measure the Oswestry, where he score a 42% today compared to 60% on initial visit (where 0% equals no perceived disability). .  His ability to stand has improved from 10 minutes to 30 minutes.  Chon also indicates greater ease with washing and dressing.  His walking remains limited mostly due to knee pain that is related to multiple previous surgeries.    Chon has met all the short term goals as well as three of five long term goals.      Goals: STGs:   1. Patient will be independent with education for symptom management, joint protection and strategies to minimize stress on affected tissues. MET    2. Pt is able to tolerate 30 min of aquatic therapy with reports of reduced pain levels after treatment for 2-3 hours.  MET    3. Pt to improve pain complaints to no more than 6/10 with ADLs and walking.  MET    LT wks  1. Pt to report no more than 4/10 pain in the LB with ADLs and walking.  ONGOING  2. Pt to improve trunk and LE strength by 1/2 to 1 MMT grade. MET  3. Pt able to exit the pool on the stairs using a reciprocal pattern.  MET  4. Pt to improve Oswestry Back index score from 60% to 46% for overall functional improvement.  MET 42%   5. Pt to be independent with progressive HEP for core and LE strength. PARTIALLY MET, Independent with current exercises, will continue to progress as " appropriate     Recommendations: Continue as planned  Timeframe: 1 month  Prognosis to achieve goals: good    PT Signature: Francisco Martinez PT  [place] License: [id number]      Based upon review of the patient's progress and continued therapy plan, it is my medical opinion that Isauro Sevilla III should continue physical therapy treatment at [facility]  [address] [place] [address] [place] [address]  [phone].    Signature: __________________________________  Sushil Waters  NPI: [id number]                                          Timed:  Aquatic therapy  86388    45   mins

## 2022-12-07 ENCOUNTER — TREATMENT (OUTPATIENT)
Dept: PHYSICAL THERAPY | Facility: CLINIC | Age: 77
End: 2022-12-07

## 2022-12-07 DIAGNOSIS — M51.36 DDD (DEGENERATIVE DISC DISEASE), LUMBAR: ICD-10-CM

## 2022-12-07 DIAGNOSIS — Z74.09 IMPAIRED FUNCTIONAL MOBILITY, BALANCE, GAIT, AND ENDURANCE: ICD-10-CM

## 2022-12-07 DIAGNOSIS — M54.50 CHRONIC BILATERAL LOW BACK PAIN WITHOUT SCIATICA: Primary | ICD-10-CM

## 2022-12-07 DIAGNOSIS — G89.29 CHRONIC BILATERAL LOW BACK PAIN WITHOUT SCIATICA: Primary | ICD-10-CM

## 2022-12-07 PROCEDURE — 97113 AQUATIC THERAPY/EXERCISES: CPT | Performed by: PHYSICAL THERAPIST

## 2022-12-07 NOTE — PROGRESS NOTES
Physical Therapy Daily Treatment Note    [facility] Physical Therapy Milestone  [address] [place], [place] [address] [phone] (phone)  [phone] (fax)    Patient: Isauro Sevilla III   : 1945  Diagnosis/ICD-10 Code:  Chronic bilateral low back pain without sciatica [M54.50, G89.29]  Referring practitioner: Sushil Waters MD  Date of Initial Visit: Type: THERAPY  Noted: 10/5/2022  Today's Date: 2022  Patient seen for 17 sessions             Subjective   I feel like I had a set back with my knees.  My shoulder is feeling better. Back is sore.  A little better with sleeping, but overall not good.    Objective     AQUATIC THERAPY:  Water walk: Independent, patient will try walking in cooler lap pool this Friday  Abdominals  Alternating Pushdowns w/ large yellow foam dumbbells X 20  Tuck Ups supported by solid noodle/Rail X 10  Hamstring Stretch  20 sec X 2  Calf Stretch  20 sec X 2  Rows w/ Hydrotones X 15  Tandem Walk  w/o Noodle support 25 ft X4  Leg Press w/ solid (white) Noodle 2 X 10 each  8 inch Step Ups X 15 Defer  Hip Abduction  20X  Mini Squats X 15  Sit to Stands X 10 Defer  Step Ups 6 inch XDefer  Suspended on noodle -bicycle leg motion X 2 min. w/ core activation  Uni-Clock X 10 Defer       Assessment/Plan  Patient reporting flare up of knee pain.  Progressed core strengthening with suspended Tuck ups holding rail with noodle support.  Also progressed abdominal pushdowns to alternating for increased challenge.  Patient has made a commitment to come to pool 1 day a week on his own.        Timed:  Aquatic Therapy    38     mins [id number];    Francisco Martinez, PT  Physical Therapist    [place] License:  [id number]

## 2022-12-14 ENCOUNTER — TREATMENT (OUTPATIENT)
Dept: PHYSICAL THERAPY | Facility: CLINIC | Age: 77
End: 2022-12-14

## 2022-12-14 DIAGNOSIS — Z74.09 IMPAIRED FUNCTIONAL MOBILITY, BALANCE, GAIT, AND ENDURANCE: ICD-10-CM

## 2022-12-14 DIAGNOSIS — G89.29 CHRONIC BILATERAL LOW BACK PAIN WITHOUT SCIATICA: Primary | ICD-10-CM

## 2022-12-14 DIAGNOSIS — M54.50 CHRONIC BILATERAL LOW BACK PAIN WITHOUT SCIATICA: Primary | ICD-10-CM

## 2022-12-14 DIAGNOSIS — M51.36 DDD (DEGENERATIVE DISC DISEASE), LUMBAR: ICD-10-CM

## 2022-12-14 PROCEDURE — 97113 AQUATIC THERAPY/EXERCISES: CPT | Performed by: PHYSICAL THERAPIST

## 2022-12-14 NOTE — PROGRESS NOTES
Physical Therapy Daily Treatment Note    [facility] Physical Therapy Milestone  [address] [place] [address]  [phone] (phone)  [phone] (fax)    Patient: Isauro Sevilla III   : 1945  Diagnosis/ICD-10 Code:  Chronic bilateral low back pain without sciatica [M54.50, G89.29]  Referring practitioner: Sushil Waters MD  Date of Initial Visit: Type: THERAPY  Noted: 10/5/2022  Today's Date: 2022  Patient seen for 18 sessions             Subjective   Last Wednesday my back pain flared up and kept me from walking comfortably.  Today it is close to baseline.  Still having trouble sleeping,- working on getting sleep study.    Objective     AQUATIC THERAPY:  Water walking Forwards and Backwards X 120 ft each, needs cues for upright posture.  Wall Walk Stretch 20 sec X 3   Calf Stretch 20 sec X 2   Hamstring Stretch w/ Lg Noodle under ankle 20 sec X 2  Hip Sweeps with LN under Bent knee X 10  Piriformis Stretch 20 sec X 2   Overhead Reach stretch 8 sec X 2  Leg Press w/ Large Noodle X 15  Low Arm Rows with Hydrotones X 15  Hydrotone Stirs  10/10  Suspended Cycles X 2 min.  Decompression Float as needed throughout session    Assessment/Plan  Patient reports flare up of back pain last week Wednesday that kept him from walking comfortably.  He is nearing baseline today.  Session modified with more focus on stretches.  Chon continues to need verbal cues to stand upright with water walking, as he has strong tendency to flex forward.          Timed:  Aquatic Therapy    38     mins [id number];    Francisco Martinez, PT  Physical Therapist    [place] License:  [id number]

## 2022-12-19 ENCOUNTER — TREATMENT (OUTPATIENT)
Dept: PHYSICAL THERAPY | Facility: CLINIC | Age: 77
End: 2022-12-19

## 2022-12-19 DIAGNOSIS — G89.29 CHRONIC BILATERAL LOW BACK PAIN WITHOUT SCIATICA: Primary | ICD-10-CM

## 2022-12-19 DIAGNOSIS — M54.50 CHRONIC BILATERAL LOW BACK PAIN WITHOUT SCIATICA: Primary | ICD-10-CM

## 2022-12-19 DIAGNOSIS — M51.36 DDD (DEGENERATIVE DISC DISEASE), LUMBAR: ICD-10-CM

## 2022-12-19 DIAGNOSIS — Z74.09 IMPAIRED FUNCTIONAL MOBILITY, BALANCE, GAIT, AND ENDURANCE: ICD-10-CM

## 2022-12-19 PROCEDURE — 97113 AQUATIC THERAPY/EXERCISES: CPT | Performed by: PHYSICAL THERAPIST

## 2022-12-19 NOTE — PROGRESS NOTES
Physical Therapy Daily Treatment Note    [facility] Physical Therapy Milestone  [address] [place] [address]  [phone] (phone)  [phone] (fax)    Patient: Isauro Sevilla III   : 1945  Diagnosis/ICD-10 Code:  Chronic bilateral low back pain without sciatica [M54.50, G89.29]  Referring practitioner: Sushil Waters MD  Date of Initial Visit: Type: THERAPY  Noted: 10/5/2022  Today's Date: 2022  Patient seen for 19 sessions             Subjective   I'm going to the chiropractor for my back today.    Objective     AQUATIC THERAPY:  Water walk: Independent  Abdominals  Alternating Pushdowns w/ large yellow foam dumbbells X 20  Hamstring Stretch  20 sec X 3  Calf Stretch  20 sec X 2  Rows w/ Hydrotones X 15  Tandem Walk  w/o Noodle support 25 ft X4  Leg Press w/ solid (white) Noodle 2 X 10 each  8 inch Step Ups 2 X 15 moved shallower  Hip Abduction  20X  Sit to Stands X 15  Suspended on noodle -bicycle leg motion X 2 min.   Uni-Clock X 10    Assessment/Plan  Moved 8 inch box shallower for step ups to increase body weight resistance.  Reviewed technique for sit to stand transfers from couch as Chon expresses difficulty getting up from couch.          Timed:  Aquatic Therapy    38     mins [id number];    Francisco Martinez, PT  Physical Therapist    [place] License:  [id number]

## 2022-12-21 ENCOUNTER — TREATMENT (OUTPATIENT)
Dept: PHYSICAL THERAPY | Facility: CLINIC | Age: 77
End: 2022-12-21

## 2022-12-21 DIAGNOSIS — G89.29 CHRONIC BILATERAL LOW BACK PAIN WITHOUT SCIATICA: Primary | ICD-10-CM

## 2022-12-21 DIAGNOSIS — M54.50 CHRONIC BILATERAL LOW BACK PAIN WITHOUT SCIATICA: Primary | ICD-10-CM

## 2022-12-21 DIAGNOSIS — M51.36 DDD (DEGENERATIVE DISC DISEASE), LUMBAR: ICD-10-CM

## 2022-12-21 DIAGNOSIS — Z74.09 IMPAIRED FUNCTIONAL MOBILITY, BALANCE, GAIT, AND ENDURANCE: ICD-10-CM

## 2022-12-21 PROCEDURE — 97113 AQUATIC THERAPY/EXERCISES: CPT | Performed by: PHYSICAL THERAPIST

## 2022-12-21 NOTE — PROGRESS NOTES
"Physical Therapy Daily Treatment Note    [facility] Physical Therapy Milestone  [address] [place] [address]  [phone] (phone)  [phone] (fax)    Patient: Isauro Sevilla III   : 1945  Diagnosis/ICD-10 Code:  Chronic bilateral low back pain without sciatica [M54.50, G89.29]  Referring practitioner: Sushil Waters MD  Date of Initial Visit: Type: THERAPY  Noted: 10/5/2022  Today's Date: 2022  Patient seen for 20 sessions             Subjective   Still trying to fight the weakness that came after not doing much during the Covid 19 \"quarentine\".    Objective     AQUATIC THERAPY:  Water walk: Independent  Abdominals  Arm Pushdowns w/ large yellow foam dumbbells X 20  Shoulder Abd/Add w/ yellow foam DBs X 20  Hamstring Stretch  w/ LN under ankle in shallow 20 sec X 2  Calf Stretch  20 sec X 2  Rows w/ Hydrotones X 15  Tandem Walk   25 ft X4  March Walk 25 ft X 2  Leg Press w/ solid (white) Noodle 2 X 10 each  8 inch Step Ups  X 15 shallow  Hip Abduction  20X Defer  Sit to Stands 2 X 10  Suspended on noodle -bicycle leg motion X 2 min.   Uni-Clock X 10  Defer  Plank  On bench 10 sec X 2  Decompression Float X 1-2 minutes    Assessment/Plan  Taught water version of the plank exercise using the bench in the pool for UE support.  Attempted with Large noodle support, however did better with control on bench.  Enhanced hamstring stretch by adding large noodle support to ankle - in shallow end.  Plan: One more visit then discharge.          Timed:  Aquatic Therapy    42     mins 40405;    Francisco Martinez, PT  Physical Therapist    [place] License:  [id number]  "

## 2023-01-06 ENCOUNTER — TREATMENT (OUTPATIENT)
Dept: PHYSICAL THERAPY | Facility: CLINIC | Age: 78
End: 2023-01-06
Payer: MEDICARE

## 2023-01-06 DIAGNOSIS — M54.50 CHRONIC BILATERAL LOW BACK PAIN WITHOUT SCIATICA: Primary | ICD-10-CM

## 2023-01-06 DIAGNOSIS — G89.29 CHRONIC BILATERAL LOW BACK PAIN WITHOUT SCIATICA: Primary | ICD-10-CM

## 2023-01-06 DIAGNOSIS — M51.36 DDD (DEGENERATIVE DISC DISEASE), LUMBAR: ICD-10-CM

## 2023-01-06 DIAGNOSIS — Z74.09 IMPAIRED FUNCTIONAL MOBILITY, BALANCE, GAIT, AND ENDURANCE: ICD-10-CM

## 2023-01-06 PROCEDURE — 97113 AQUATIC THERAPY/EXERCISES: CPT | Performed by: PHYSICAL THERAPIST

## 2023-01-06 NOTE — PROGRESS NOTES
Physical Therapy Daily Treatment Note/Discharge Note    [facility] Physical Therapy Milestone  [address] [place] [address]  [phone] (phone)  [phone] (fax)    Patient: Isauro Sevilla III   : 1945  Diagnosis/ICD-10 Code:  Chronic bilateral low back pain without sciatica [M54.50, G89.29]  Referring practitioner: Sushil Waters MD  Date of Initial Visit: Type: THERAPY  Noted: 10/5/2022  Today's Date: 2023  Patient seen for 21 sessions             Subjective   I'm starting to walk at home without my cane.  My back is still achey,  pain rated 4/10.  My walking is better.  I can go up the pool steps one foot over the other now.  I plan to exercise at the pool 3 days a week.      Objective     AQUATIC THERAPY:  Water walk: Independent  Abdominals  Arm Pushdowns w/ large yellow foam dumbbells X 20  Shoulder Abd/Add w/ yellow foam DBs X 20  Hamstring Stretch  w/ LN under ankle in shallow 20 sec X 2  Calf Stretch  20 sec X 2  B Heel/Toe Raises X 15  Rows w/ Hydrotones X 15  Tandem Walk   25 ft X4  March Walk 25 ft X 2  Leg Press w/ solid (white) Noodle 2 X 20 each  8 inch Step Ups  X 15   Hip Abduction  20X Defer  Sit to Stands completed independently  Suspended on noodle -bicycle leg motion -completed independently  Uni-Clock X 10  Defer      Assessment/Plan   All the goals have been met.    Goals: STGs:   1. Patient will be independent with education for symptom management, joint protection and strategies to minimize stress on affected tissues. MET    2. Pt is able to tolerate 30 min of aquatic therapy with reports of reduced pain levels after treatment for 2-3 hours.  MET    3. Pt to improve pain complaints to no more than 6/10 with ADLs and walking.  MET    LT wks  1. Pt to report no more than 4/10 pain in the LB with ADLs and walking. MET  2. Pt to improve trunk and LE strength by 1/2 to 1 MMT grade. MET  3. Pt able to exit the pool on the stairs using a reciprocal  pattern.  MET  4. Pt to improve Oswestry Back index score from 60% to 46% for overall functional improvement.  MET 42%   5. Pt to be independent with progressive HEP for core and LE strength. MET Givens is a member of the [facility] and plans to continue his aquatic exercise 3 X /week.      Timed:  Aquatic Therapy    30     mins 96411;    Francisco Martinez, PT  Physical Therapist    [place] License:  [id number]

## 2024-08-21 ENCOUNTER — TRANSCRIBE ORDERS (OUTPATIENT)
Dept: PHYSICAL THERAPY | Facility: CLINIC | Age: 79
End: 2024-08-21
Payer: MEDICARE

## 2024-08-21 DIAGNOSIS — M79.10 MYALGIA: Primary | ICD-10-CM

## 2024-08-27 ENCOUNTER — TREATMENT (OUTPATIENT)
Dept: PHYSICAL THERAPY | Facility: CLINIC | Age: 79
End: 2024-08-27
Payer: MEDICARE

## 2024-08-27 DIAGNOSIS — M54.50 CHRONIC BILATERAL LOW BACK PAIN, UNSPECIFIED WHETHER SCIATICA PRESENT: Primary | ICD-10-CM

## 2024-08-27 DIAGNOSIS — M17.12 PRIMARY OSTEOARTHRITIS OF LEFT KNEE: ICD-10-CM

## 2024-08-27 DIAGNOSIS — G89.29 CHRONIC PAIN OF BOTH KNEES: ICD-10-CM

## 2024-08-27 DIAGNOSIS — M25.562 CHRONIC PAIN OF BOTH KNEES: ICD-10-CM

## 2024-08-27 DIAGNOSIS — Z74.09 IMPAIRED FUNCTIONAL MOBILITY, BALANCE, GAIT, AND ENDURANCE: ICD-10-CM

## 2024-08-27 DIAGNOSIS — G89.29 CHRONIC BILATERAL LOW BACK PAIN, UNSPECIFIED WHETHER SCIATICA PRESENT: Primary | ICD-10-CM

## 2024-08-27 DIAGNOSIS — M54.2 CERVICAL PAIN: ICD-10-CM

## 2024-08-27 DIAGNOSIS — M25.561 CHRONIC PAIN OF BOTH KNEES: ICD-10-CM

## 2024-08-27 PROCEDURE — 97162 PT EVAL MOD COMPLEX 30 MIN: CPT | Performed by: PHYSICAL THERAPIST

## 2024-08-27 PROCEDURE — 97530 THERAPEUTIC ACTIVITIES: CPT | Performed by: PHYSICAL THERAPIST
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
## 2024-09-20 ENCOUNTER — TREATMENT (OUTPATIENT)
Dept: PHYSICAL THERAPY | Facility: CLINIC | Age: 79
End: 2024-09-20
Payer: MEDICARE

## 2024-09-20 DIAGNOSIS — M25.561 CHRONIC PAIN OF BOTH KNEES: ICD-10-CM

## 2024-09-20 DIAGNOSIS — M54.50 CHRONIC BILATERAL LOW BACK PAIN, UNSPECIFIED WHETHER SCIATICA PRESENT: Primary | ICD-10-CM

## 2024-09-20 DIAGNOSIS — G89.29 CHRONIC BILATERAL LOW BACK PAIN, UNSPECIFIED WHETHER SCIATICA PRESENT: Primary | ICD-10-CM

## 2024-09-20 DIAGNOSIS — M17.12 PRIMARY OSTEOARTHRITIS OF LEFT KNEE: ICD-10-CM

## 2024-09-20 DIAGNOSIS — G89.29 CHRONIC PAIN OF BOTH KNEES: ICD-10-CM

## 2024-09-20 DIAGNOSIS — Z74.09 IMPAIRED FUNCTIONAL MOBILITY, BALANCE, GAIT, AND ENDURANCE: ICD-10-CM

## 2024-09-20 DIAGNOSIS — M54.2 CERVICAL PAIN: ICD-10-CM

## 2024-09-20 DIAGNOSIS — M25.562 CHRONIC PAIN OF BOTH KNEES: ICD-10-CM

## 2024-09-20 PROCEDURE — 97113 AQUATIC THERAPY/EXERCISES: CPT | Performed by: PHYSICAL THERAPIST

## 2024-09-27 ENCOUNTER — TREATMENT (OUTPATIENT)
Dept: PHYSICAL THERAPY | Facility: CLINIC | Age: 79
End: 2024-09-27
Payer: MEDICARE

## 2024-09-27 DIAGNOSIS — M25.561 CHRONIC PAIN OF BOTH KNEES: ICD-10-CM

## 2024-09-27 DIAGNOSIS — Z74.09 IMPAIRED FUNCTIONAL MOBILITY, BALANCE, GAIT, AND ENDURANCE: ICD-10-CM

## 2024-09-27 DIAGNOSIS — M54.2 CERVICAL PAIN: ICD-10-CM

## 2024-09-27 DIAGNOSIS — G89.29 CHRONIC PAIN OF BOTH KNEES: ICD-10-CM

## 2024-09-27 DIAGNOSIS — G89.29 CHRONIC BILATERAL LOW BACK PAIN, UNSPECIFIED WHETHER SCIATICA PRESENT: Primary | ICD-10-CM

## 2024-09-27 DIAGNOSIS — M25.562 CHRONIC PAIN OF BOTH KNEES: ICD-10-CM

## 2024-09-27 DIAGNOSIS — M54.50 CHRONIC BILATERAL LOW BACK PAIN, UNSPECIFIED WHETHER SCIATICA PRESENT: Primary | ICD-10-CM

## 2024-09-27 DIAGNOSIS — M17.12 PRIMARY OSTEOARTHRITIS OF LEFT KNEE: ICD-10-CM

## 2024-09-27 PROCEDURE — 97113 AQUATIC THERAPY/EXERCISES: CPT | Performed by: PHYSICAL THERAPIST

## 2024-09-30 ENCOUNTER — TREATMENT (OUTPATIENT)
Dept: PHYSICAL THERAPY | Facility: CLINIC | Age: 79
End: 2024-09-30
Payer: MEDICARE

## 2024-09-30 DIAGNOSIS — M54.2 CERVICAL PAIN: ICD-10-CM

## 2024-09-30 DIAGNOSIS — M54.50 CHRONIC BILATERAL LOW BACK PAIN, UNSPECIFIED WHETHER SCIATICA PRESENT: Primary | ICD-10-CM

## 2024-09-30 DIAGNOSIS — M25.562 CHRONIC PAIN OF BOTH KNEES: ICD-10-CM

## 2024-09-30 DIAGNOSIS — M17.12 PRIMARY OSTEOARTHRITIS OF LEFT KNEE: ICD-10-CM

## 2024-09-30 DIAGNOSIS — G89.29 CHRONIC PAIN OF BOTH KNEES: ICD-10-CM

## 2024-09-30 DIAGNOSIS — M25.561 CHRONIC PAIN OF BOTH KNEES: ICD-10-CM

## 2024-09-30 DIAGNOSIS — Z74.09 IMPAIRED FUNCTIONAL MOBILITY, BALANCE, GAIT, AND ENDURANCE: ICD-10-CM

## 2024-09-30 DIAGNOSIS — G89.29 CHRONIC BILATERAL LOW BACK PAIN, UNSPECIFIED WHETHER SCIATICA PRESENT: Primary | ICD-10-CM

## 2024-09-30 PROCEDURE — 97113 AQUATIC THERAPY/EXERCISES: CPT | Performed by: PHYSICAL THERAPIST

## 2024-09-30 NOTE — PROGRESS NOTES
Physical Therapy Daily Treatment Note    [facility] Physical Therapy Milestone  [address] [place] [address]  [phone] (phone)  [phone] (fax)    Patient: Isauro Sevilla III   : 1945  Diagnosis/ICD-10 Code:  Chronic bilateral low back pain, unspecified whether sciatica present [M54.50, G89.29]  Referring practitioner: Bryan Mcmullen MD  Date of Initial Visit: Type: THERAPY  Noted: 2024  Today's Date: 2024  Patient seen for 4 sessions             Subjective   My problem is my neck, I can't do any exercise with my arms over my head.  My left hip has been hurting.    Objective   AQUATIC EXERCISE:  Walk Fwds, Sideways and Backwards X 6 min.  Tandem Walk, Marching  and Braiding X 4 min.  Leg Press in deep end w/ solid noodle plus medium foam ring X 30 each  Sit to Stands from pool bench --  Deep End Tuck Ups 30X  Rows w/ closed paddles X 15    Piriformis Stretch standing 20 sec X 2 each   Lateral Trunk Stretch w/ Sidebend motion X 5 each side       Calf Stretch  20 sec x 2 each       deferred                                Vertical Traction   --      Abdominals Solid Noodle Pushdowns X 15, Obliques X 6ea                                Hip Abd/Add   w/ one hand support in deep end X 15 ea            Hip Flex/Ext   --                     Mini Squat   --               B Heel Raises  off edge of step X 40     Shoulder Flex/Ext w/  medium aqualogix X 10  Uni-Squat  --                 Uni-Clock   --                Runners Step Ups  8 inch in Shallow w/ hand support X 15 ea                 Bicycle  Deep water suspended on noodle X 2 min.    Suspended Flutter and Scissors kick X 20 ea                                  Assessment/Plan  Right elbow is feeling better, Yayo was able to resume UE resistive equipment for abdominal stabilization exercises.  He does not want to do any overhead arm motions because of neck pain.          Timed:  Aquatic Therapy    40     mins  98547;    Francisco Martinez, PT  Physical Therapist    [place] License:  [id number]

## 2024-10-04 ENCOUNTER — TREATMENT (OUTPATIENT)
Dept: PHYSICAL THERAPY | Facility: CLINIC | Age: 79
End: 2024-10-04
Payer: MEDICARE

## 2024-10-04 DIAGNOSIS — G89.29 CHRONIC BILATERAL LOW BACK PAIN, UNSPECIFIED WHETHER SCIATICA PRESENT: Primary | ICD-10-CM

## 2024-10-04 DIAGNOSIS — M54.2 CERVICAL PAIN: ICD-10-CM

## 2024-10-04 DIAGNOSIS — M17.12 PRIMARY OSTEOARTHRITIS OF LEFT KNEE: ICD-10-CM

## 2024-10-04 DIAGNOSIS — Z74.09 IMPAIRED FUNCTIONAL MOBILITY, BALANCE, GAIT, AND ENDURANCE: ICD-10-CM

## 2024-10-04 DIAGNOSIS — G89.29 CHRONIC PAIN OF BOTH KNEES: ICD-10-CM

## 2024-10-04 DIAGNOSIS — M25.562 CHRONIC PAIN OF BOTH KNEES: ICD-10-CM

## 2024-10-04 DIAGNOSIS — M54.50 CHRONIC BILATERAL LOW BACK PAIN, UNSPECIFIED WHETHER SCIATICA PRESENT: Primary | ICD-10-CM

## 2024-10-04 DIAGNOSIS — M25.561 CHRONIC PAIN OF BOTH KNEES: ICD-10-CM

## 2024-10-04 PROCEDURE — 97113 AQUATIC THERAPY/EXERCISES: CPT | Performed by: PHYSICAL THERAPIST

## 2024-10-04 NOTE — PROGRESS NOTES
Physical Therapy Daily Treatment Note    [facility]  [address] [id number]  [phone] (phone)  [phone] (fax)    Patient: Isauro Sevilla III   : 1945  Diagnosis/ICD-10 Code:  Chronic bilateral low back pain, unspecified whether sciatica present [M54.50, G89.29]  Referring practitioner: Bryan Mcmullen MD  Date of Initial Visit: Type: THERAPY  Noted: 2024  Today's Date: 10/4/2024  Patient seen for 5 sessions             Subjective   The biggest thing that is bothering me is that tendon on the outside of my left knee.    Objective     AQUATIC EXERCISE:  Walk Fwds, Sideways and Backwards X 6 min.  Tandem Walk, Marching  and Braiding X 4 min.  Leg Press in deep end w/ solid noodle plus medium foam ring X 30 each  Hip Sweep for Piriformis and ITB Stretch  10 X ea  Deep End Tuck Ups 40X  Rows w/ closed paddles X 15  deferred   Calf Stretch  20 sec x 2 each       deferred                                Vertical Traction   --      Abdominals Solid Noodle Pushdowns X 15, Obliques X 6ea                                Hip Abd/Add   w/ one hand support in deep end --            Hip Flex/Ext   --                     Mini Squat   --               B Heel Raises  off edge of step X 40     Shoulder Flex/Ext w/  medium aqualogix X 10                          Runners Step Ups  8 inch in Shallow w/ hand support X 15 ea                 Bicycle  Deep water suspended on noodle X 2 min.    Suspended Flutter and Scissors kick X 20 ea  deferred                                 Assessment/Plan  Yayo reports good benefit from hip sweep exercise to stretch piriformis and ITB on his left side.  He needs demonstration to perform Runners step up correctly.          Timed:  Aquatic Therapy    25     mins 09701;    Francisco Martinez, PT  Physical Therapist    [place] License:  [id number]

## 2024-10-07 ENCOUNTER — TREATMENT (OUTPATIENT)
Dept: PHYSICAL THERAPY | Facility: CLINIC | Age: 79
End: 2024-10-07
Payer: MEDICARE

## 2024-10-07 DIAGNOSIS — M25.562 CHRONIC PAIN OF BOTH KNEES: ICD-10-CM

## 2024-10-07 DIAGNOSIS — Z74.09 IMPAIRED FUNCTIONAL MOBILITY, BALANCE, GAIT, AND ENDURANCE: ICD-10-CM

## 2024-10-07 DIAGNOSIS — M54.50 CHRONIC BILATERAL LOW BACK PAIN, UNSPECIFIED WHETHER SCIATICA PRESENT: Primary | ICD-10-CM

## 2024-10-07 DIAGNOSIS — G89.29 CHRONIC PAIN OF BOTH KNEES: ICD-10-CM

## 2024-10-07 DIAGNOSIS — M54.2 CERVICAL PAIN: ICD-10-CM

## 2024-10-07 DIAGNOSIS — G89.29 CHRONIC BILATERAL LOW BACK PAIN, UNSPECIFIED WHETHER SCIATICA PRESENT: Primary | ICD-10-CM

## 2024-10-07 DIAGNOSIS — M17.12 PRIMARY OSTEOARTHRITIS OF LEFT KNEE: ICD-10-CM

## 2024-10-07 DIAGNOSIS — M25.561 CHRONIC PAIN OF BOTH KNEES: ICD-10-CM

## 2024-10-07 PROCEDURE — 97113 AQUATIC THERAPY/EXERCISES: CPT | Performed by: PHYSICAL THERAPIST

## 2024-10-07 NOTE — PROGRESS NOTES
Physical Therapy Daily Treatment Note    [facility] Physical Therapy Milestone  [address] [place] [address]  [phone] (phone)  [phone] (fax)    Patient: Isauro Sevilla III   : 1945  Diagnosis/ICD-10 Code:  Chronic bilateral low back pain, unspecified whether sciatica present [M54.50, G89.29]  Referring practitioner: Bryan Mcmullen MD  Date of Initial Visit: Type: THERAPY  Noted: 2024  Today's Date: 10/7/2024  Patient seen for 6 sessions             Subjective   That new stretch helped a lot.    Objective     AQUATIC EXERCISE:  Walk Fwds, Sideways and Backwards - Independent  Tandem Walk, Marching  and Braiding --  Leg Press in deep end w/ solid noodle plus medium foam ring --  Hamstring Stretch LN under ankle just below 4 ft depth 30 sec X 2 ea Next try heel prop on bench  Hip Sweep  w/ noodle under knee for Dynamic Piriformis/ ITB Stretch  10 X ea w/ 20 sec hold at end  Quad Stretch right w/ Lg Noodle under left ankle 30 sec X 2  Mini Squats in shallow - worked on technique to avoid increased joint strain  Deep End Tuck Ups 40X  Rows w/ closed paddles X 15  deferred   Calf Stretch  20 sec x 2 each                                     Abdominals Solid Noodle Pushdowns X 20, Obliques X 6 ea                                Hip Abd/Add   w/ one hand support in deep end --            Hip Flex/Ext   --                     Mini Squat   --               B Heel Raises  off edge of step X 40     Shoulder Flex/Ext w/  medium aqualogix X 10                          Runners Step Ups  8 inch in Shallow w/ hand support X 15 ea                 Bicycle  Deep water suspended on noodle - Independent   Suspended Flutter and Scissors kick X 20 ea  deferred                                Assessment/Plan  Worked on mini squat technique in shallow water to avoid knee strain and compensation.  Added quadricep stretching to improve knee flexion ROM that is limited.  Also instructed in ideal  depth for assisted hamstring stretch with noodle support and moving from dynamic ITB stretch to static with noodle support under the knee.      Timed:  Aquatic Therapy    25     mins 91465;    Francisco Martinez PT  Physical Therapist    [place] License:  [id number]

## 2024-10-11 ENCOUNTER — TREATMENT (OUTPATIENT)
Dept: PHYSICAL THERAPY | Facility: CLINIC | Age: 79
End: 2024-10-11
Payer: MEDICARE

## 2024-10-11 DIAGNOSIS — M25.562 CHRONIC PAIN OF BOTH KNEES: ICD-10-CM

## 2024-10-11 DIAGNOSIS — Z74.09 IMPAIRED FUNCTIONAL MOBILITY, BALANCE, GAIT, AND ENDURANCE: ICD-10-CM

## 2024-10-11 DIAGNOSIS — M17.12 PRIMARY OSTEOARTHRITIS OF LEFT KNEE: ICD-10-CM

## 2024-10-11 DIAGNOSIS — G89.29 CHRONIC BILATERAL LOW BACK PAIN, UNSPECIFIED WHETHER SCIATICA PRESENT: Primary | ICD-10-CM

## 2024-10-11 DIAGNOSIS — M54.50 CHRONIC BILATERAL LOW BACK PAIN, UNSPECIFIED WHETHER SCIATICA PRESENT: Primary | ICD-10-CM

## 2024-10-11 DIAGNOSIS — M54.2 CERVICAL PAIN: ICD-10-CM

## 2024-10-11 DIAGNOSIS — M25.561 CHRONIC PAIN OF BOTH KNEES: ICD-10-CM

## 2024-10-11 DIAGNOSIS — G89.29 CHRONIC PAIN OF BOTH KNEES: ICD-10-CM

## 2024-10-11 PROCEDURE — 97113 AQUATIC THERAPY/EXERCISES: CPT | Performed by: PHYSICAL THERAPIST

## 2024-10-11 NOTE — PROGRESS NOTES
"Physical Therapy Daily Treatment Note    [facility] Physical Therapy Milestone  [address] [place] [address]  [phone] (phone)  [phone] (fax)    Patient: Isauro Sevilla III   : 1945  Diagnosis/ICD-10 Code:  Chronic bilateral low back pain, unspecified whether sciatica present [M54.50, G89.29]  Referring practitioner: Bryan Mcmullen MD  Date of Initial Visit: Type: THERAPY  Noted: 2024  Today's Date: 10/11/2024  Patient seen for 7 sessions             Subjective   My biggest problem is my neck.    Objective     Rows w/ closed paddles 2 X 10  Paddle Stirs  15/15  Shoulder ER/ IR w/ closed paddles 2 X 10   Solid Noodle Pushdowns X 20  Wall PushUps X 2, discontinued, bothered elbow   \"\" Fwds/Bkwds 15 ft each  4 Square Stepping X 5  Tandem Walk w/ small foam dumbbells 25 ft X 4      Assessment/Plan  More neuromuscular reeducation training to help with gait stability and decreasing risk for falls.           Timed:  Aquatic Therapy    25     mins 67321;    Francisco Martinez, PT  Physical Therapist    [place] License:  [id number]  "

## 2024-10-18 ENCOUNTER — TREATMENT (OUTPATIENT)
Dept: PHYSICAL THERAPY | Facility: CLINIC | Age: 79
End: 2024-10-18
Payer: MEDICARE

## 2024-10-18 DIAGNOSIS — M54.50 CHRONIC BILATERAL LOW BACK PAIN, UNSPECIFIED WHETHER SCIATICA PRESENT: Primary | ICD-10-CM

## 2024-10-18 DIAGNOSIS — M54.2 CERVICAL PAIN: ICD-10-CM

## 2024-10-18 DIAGNOSIS — G89.29 CHRONIC PAIN OF BOTH KNEES: ICD-10-CM

## 2024-10-18 DIAGNOSIS — G89.29 CHRONIC BILATERAL LOW BACK PAIN, UNSPECIFIED WHETHER SCIATICA PRESENT: Primary | ICD-10-CM

## 2024-10-18 DIAGNOSIS — M25.562 CHRONIC PAIN OF BOTH KNEES: ICD-10-CM

## 2024-10-18 DIAGNOSIS — Z74.09 IMPAIRED FUNCTIONAL MOBILITY, BALANCE, GAIT, AND ENDURANCE: ICD-10-CM

## 2024-10-18 DIAGNOSIS — M25.561 CHRONIC PAIN OF BOTH KNEES: ICD-10-CM

## 2024-10-18 DIAGNOSIS — M17.12 PRIMARY OSTEOARTHRITIS OF LEFT KNEE: ICD-10-CM

## 2024-10-18 PROCEDURE — 97113 AQUATIC THERAPY/EXERCISES: CPT | Performed by: PHYSICAL THERAPIST

## 2024-10-18 NOTE — PROGRESS NOTES
"Physical Therapy Daily Treatment Note    [facility] Physical Therapy [facility]estone  [address] [place] [address]  [phone] (phone)  [phone] (fax)    Patient: Isauro Sevilla III   : 1945  Diagnosis/ICD-10 Code:  Chronic bilateral low back pain, unspecified whether sciatica present [M54.50, G89.29]  Referring practitioner: Bryan Mcmullen MD  Date of Initial Visit: Type: THERAPY  Noted: 2024  Today's Date: 10/21/2024  Patient seen for 8 sessions             Subjective   Low back hurts with sitting.     Objective     AQUATIC EXERCISE:  Rows w/ closed paddles 2 X 10  Paddle Stirs  15/15  Shoulder ER/ IR w/ closed paddles 2 X 10   Solid Noodle Pushdowns X 20  \"\" Fwds/Bkwds 25 ft each  4 Square Stepping X 5 CW and CCW  Walk w/ head turns 20 ft X 4  Tandem Walk w/ small foam dumbbells 25 ft X 4  Balance on one leg holding lg foam dumbbells 3 sets of 5-6 sec. each  Tandem Stand Balance holding lg foam dumbbells 4 sets of 10 sec.  Decompression on 2 Noodles in deep end -Instruction to perform 1-3X during exercise       Assessment/Plan  Recommended use of lumbar support for sitting.  Continue focus on balance training today.  Yayo is able to make progress during the session.          Timed:  Aquatic Therapy    38     mins 13981;    Therapeutic Activities  _____ mins 96409    Self-Care    _____ mins 48653     Untimed;  Group Therapy           _____ mins 51504    Total Timed:               ______ mins    Francisco Martinez, PT  Physical Therapist    [place] License:  [id number]  "

## 2024-11-01 ENCOUNTER — TREATMENT (OUTPATIENT)
Dept: PHYSICAL THERAPY | Facility: CLINIC | Age: 79
End: 2024-11-01
Payer: MEDICARE

## 2024-11-01 DIAGNOSIS — G89.29 CHRONIC BILATERAL LOW BACK PAIN, UNSPECIFIED WHETHER SCIATICA PRESENT: Primary | ICD-10-CM

## 2024-11-01 DIAGNOSIS — M54.50 CHRONIC BILATERAL LOW BACK PAIN, UNSPECIFIED WHETHER SCIATICA PRESENT: Primary | ICD-10-CM

## 2024-11-01 DIAGNOSIS — M54.2 CERVICAL PAIN: ICD-10-CM

## 2024-11-01 DIAGNOSIS — M17.12 PRIMARY OSTEOARTHRITIS OF LEFT KNEE: ICD-10-CM

## 2024-11-01 DIAGNOSIS — M25.562 CHRONIC PAIN OF BOTH KNEES: ICD-10-CM

## 2024-11-01 DIAGNOSIS — Z74.09 IMPAIRED FUNCTIONAL MOBILITY, BALANCE, GAIT, AND ENDURANCE: ICD-10-CM

## 2024-11-01 DIAGNOSIS — M25.561 CHRONIC PAIN OF BOTH KNEES: ICD-10-CM

## 2024-11-01 DIAGNOSIS — G89.29 CHRONIC PAIN OF BOTH KNEES: ICD-10-CM

## 2024-11-01 PROCEDURE — 97113 AQUATIC THERAPY/EXERCISES: CPT | Performed by: PHYSICAL THERAPIST

## 2024-11-01 NOTE — PROGRESS NOTES
"Physical Therapy 60-Day Progress Note     [facility]  [address] [place] [address]  [phone] (phone)  [phone] (fax)    Patient: Isauro Sevilla III   : 1945  Diagnosis/ICD-10 Code:  Chronic bilateral low back pain, unspecified whether sciatica present [M54.50, G89.29]  Referring practitioner: Bryan Mcmullen MD  Date of Initial Visit: Type: THERAPY  Noted: 2024  Today's Date: 2024  Patient seen for 9 sessions      Subjective:     Clinical Progress: improved  Home Program Compliance: Yes  Treatment has included:  aquatic therapy    Subjective   Pain in neck and back.    Objective     Functional outcome score:  5 X Sit to Stand Test: 16.3 sec. (No UE assist)    LEFS score 19/80      MMT:  Hip Flexion  5/5 B  Knee Extension  5/5 B   Knee Flexion  5/5 B   Ankle DF  5/5 B    Ankle DF AROM is limited ~ 2 degrees    AQUATIC EXERCISE:  Rows w/ closed paddles 2 X 10  Paddle Stirs  --  Shoulder ER/IR w/ open paddles  --   Solid Noodle Pushdowns X 20  \"\" Fwds/Bkwds 25 ft each  4 Square Stepping X 5 CW and CCW  Walk w/ head turns 20 ft X 4  Tandem Walk w/ small foam dumbbells 25 ft X 4  Balance on one leg w/ No UE support 1-2 sec   Tandem Stand Balance holding lg foam dumbbells 4 sets of 10 sec.  Decompression on 2 Noodles in deep end as needed       Assessment & Plan       Assessment  Assessment details: Isauro Sevilla III is a 79 y.o. year-old male referred to physical therapy for multi joint pain, decreased balance with history of falls. He has a new order from his Doctor to continue Physical Therapy.  Yayo has attended 8 sessions of P.T. in the past 2 months.  His gait stability has improved and he has not had any falls since starting therapy.  Yayo is appropriate to continue skilled therapy with more focus on balance and neuromuscular reeducation training to decrease his fall risk. The short term goals have been met and 2 of 5 " long term goals are met.  Prognosis: good    Goals  Plan Goals: STGs:  1. Patient will be independent with education for symptom management, joint protection and strategies to minimize stress on affected tissues. MET    2. Pt is able to tolerate 30 min of aquatic therapy with reports of reduced pain levels after treatment for a couple of hours.   MET    3. Pt to improve 5xSTS from 18.8 sec to 16 sec. MET    4. Pt to improve TUG from 17.8 sec to 16 sec.  MET    LT wks  1. Pt to 5xSTS from 16 sec to 14 sec for greater ease getting in/out of the car. ONGOING  2. Pt to improve trunk and LE strength by 1/2 to 1 MMT grade for ease of getting in/out of the therapy pool to continue with his workouts following PHYSICAL THERAPY.  MET  3. Pt to improve TUG from 16 sec to 14 sec for improved balance and mobility. ONGOING  4. Pt to be independent with progressive HEP for core and LE strength. MET  5. Patient to be independent with aquatic exercises for balance. NEW      Plan  Therapy options: will be seen for skilled therapy services  Frequency: 2x week  Duration in weeks: 6  Treatment plan discussed with: patient           Recommendations: Continue as planned  Timeframe: 1 month  Prognosis to achieve goals: good    PT Signature: Francisco Martinez, PT  [place] License: [id number]      Based upon review of the patient's progress and continued therapy plan, it is my medical opinion that Isauro Sevilla III should continue physical therapy treatment at [facility] PHYSICAL THERAPY  [address] [place] [address] [place] [address]  [phone].    Signature: __________________________________  Bryan Mcmullen MD  NPI: [id number]                                          Timed:  Aquatic Therapy      40   mins 84084    Therapeutic Activities  _____ mins 81083    Self-Care    _____ mins 71567     Untimed;  Group Therapy           _____ mins 04879    Total Timed:               ______ mins

## 2024-11-08 ENCOUNTER — TREATMENT (OUTPATIENT)
Dept: PHYSICAL THERAPY | Facility: CLINIC | Age: 79
End: 2024-11-08
Payer: MEDICARE

## 2024-11-08 DIAGNOSIS — M17.12 PRIMARY OSTEOARTHRITIS OF LEFT KNEE: ICD-10-CM

## 2024-11-08 DIAGNOSIS — M25.562 CHRONIC PAIN OF BOTH KNEES: ICD-10-CM

## 2024-11-08 DIAGNOSIS — G89.29 CHRONIC PAIN OF BOTH KNEES: ICD-10-CM

## 2024-11-08 DIAGNOSIS — M25.561 CHRONIC PAIN OF BOTH KNEES: ICD-10-CM

## 2024-11-08 DIAGNOSIS — M54.2 CERVICAL PAIN: ICD-10-CM

## 2024-11-08 DIAGNOSIS — Z74.09 IMPAIRED FUNCTIONAL MOBILITY, BALANCE, GAIT, AND ENDURANCE: ICD-10-CM

## 2024-11-08 DIAGNOSIS — G89.29 CHRONIC BILATERAL LOW BACK PAIN, UNSPECIFIED WHETHER SCIATICA PRESENT: Primary | ICD-10-CM

## 2024-11-08 DIAGNOSIS — M54.50 CHRONIC BILATERAL LOW BACK PAIN, UNSPECIFIED WHETHER SCIATICA PRESENT: Primary | ICD-10-CM

## 2024-11-08 PROCEDURE — 97113 AQUATIC THERAPY/EXERCISES: CPT | Performed by: PHYSICAL THERAPIST

## 2024-11-08 NOTE — PROGRESS NOTES
"Physical Therapy Daily Treatment Note    [facility]  [address] [place] [address]  [phone] (phone)  [phone] (fax)    Patient: Isauro Sevilla III   : 1945  Diagnosis/ICD-10 Code:  Chronic bilateral low back pain, unspecified whether sciatica present [M54.50, G89.29]  Referring practitioner: Bryan Mcmullen MD  Date of Initial Visit: Type: THERAPY  Noted: 2024  Today's Date: 2024  Patient seen for 10 sessions             Subjective   No complaints.    Objective     AQUATIC EXERCISE:  Lateral Step outs w/ contraleral side reaching w/ plastic ball X 15 each side  Arm Reach w/ contralateral hip extension X 15 ea  Rows w/ closed paddles --  Paddle Stirs  --  Shoulder ER/IR w/ open paddles  --   Solid Noodle Pushdowns X 20 Deferred  \"\" Fwds 25 ft X 3  4 Square Stepping X 5 CW and CCW  Walk w/ head turns 20 ft X 4  Tandem Walk w/ small foam dumbbells 25 ft X 4  Balance on one leg w/ lg foam dumbbell support 30 sec X 2 each  Tandem Stand Balance holding lg foam dumbbells 4 sets of 10 sec.Deferred  Decompression on 2 Noodles in deep end as needed    Assessment/Plan  Provided cognitive motor activities that challenge Yayo's balance/coordination           Timed:  Aquatic Therapy    30     mins 43707;    Therapeutic Activities  _____ mins 66370    Self-Care    _____ mins 57085     Untimed;  Group Therapy           _____ mins 29969    Total Timed:               ______ mins    Francisco Martinez, PT  Physical Therapist    [place] License:  [id number]  "

## 2024-12-06 ENCOUNTER — TREATMENT (OUTPATIENT)
Dept: PHYSICAL THERAPY | Facility: CLINIC | Age: 79
End: 2024-12-06
Payer: MEDICARE

## 2024-12-06 DIAGNOSIS — G89.29 CHRONIC BILATERAL LOW BACK PAIN, UNSPECIFIED WHETHER SCIATICA PRESENT: Primary | ICD-10-CM

## 2024-12-06 DIAGNOSIS — M54.50 CHRONIC BILATERAL LOW BACK PAIN, UNSPECIFIED WHETHER SCIATICA PRESENT: Primary | ICD-10-CM

## 2024-12-06 DIAGNOSIS — M25.562 CHRONIC PAIN OF BOTH KNEES: ICD-10-CM

## 2024-12-06 DIAGNOSIS — G89.29 CHRONIC PAIN OF BOTH KNEES: ICD-10-CM

## 2024-12-06 DIAGNOSIS — Z74.09 IMPAIRED FUNCTIONAL MOBILITY, BALANCE, GAIT, AND ENDURANCE: ICD-10-CM

## 2024-12-06 DIAGNOSIS — M25.561 CHRONIC PAIN OF BOTH KNEES: ICD-10-CM

## 2024-12-06 DIAGNOSIS — M17.12 PRIMARY OSTEOARTHRITIS OF LEFT KNEE: ICD-10-CM

## 2024-12-06 DIAGNOSIS — M54.2 CERVICAL PAIN: ICD-10-CM

## 2024-12-06 PROCEDURE — 97113 AQUATIC THERAPY/EXERCISES: CPT | Performed by: PHYSICAL THERAPIST

## 2024-12-06 NOTE — PROGRESS NOTES
Re-Assessment / Re-Certification    [facility]  [address] [place] [address]  [phone] (phone)  [phone] (fax)    Patient: Isauro Sevilla III   : 1945  Diagnosis/ICD-10 Code:  Chronic bilateral low back pain, unspecified whether sciatica present [M54.50, G89.29]  Referring practitioner: Bryan Mcmullen MD  Date of Initial Visit: Type: THERAPY  Noted: 2024  Today's Date: 2024  Patient seen for 11 sessions      Subjective:     Functional Outcome Score: LEFS  Clinical Progress: Improved until recent fall last week that aggravated his knee  Home Program Compliance: Yes  Treatment has included:  aquatic therapy    Subjective   Saturday I slipped on a wet floor where our new puppy had an accident and I wrenched my left knee.  I had to miss a couple a couple appointments because of my knee.   I had X-rays done yesterday and am going to see Pain Management next week.   Pain rated 4-5/10.    Objective     5 X Sit to Stand test: completed in 16.8 sec. with B hand support on cane.    Timed Up and Go Test: with cane, completed in 18 seconds    AQUATIC EXERCISE:  Calf Stretch 20 sec X 2 each  HS Stretch w/ white noodle support  20 sec X 2 each  Abdominals  Lg blue foam dumbbell Pressdowns X 20   Side arm press w/ blue DB X 20  Rows w/ closed paddles 2 X 10  Paddle Stirs  10/10  Hip Abd 15x ea  Leg Press - Hold  Step Ups - Hold    Assessment & Plan       Assessment  Assessment details: Isauro Sevilla III is a 79 y.o. year-old male referred to physical therapy for multi joint pain and decreased balance with history of falls.  Yayo has attended 11 sessions of P.T. in the past 3 months (there was a one month lapse in his treatment due to scheduling conflicts).  His gait stability has improved, however he did slip on a wet floor last week that affected his left knee. 3 of 4 short term goals have been met and 2 of 5 long term goals are met.  Yayo is  appropriate to continue skilled therapy to decrease his fall risk and improve his ability to transfer from sit to stand as well as his functional mobility.  Prognosis: good    Goals  Plan Goals: STGs:  1. Patient will be independent with education for symptom management, joint protection and strategies to minimize stress on affected tissues. MET    2. Pt is able to tolerate 30 min of aquatic therapy with reports of reduced pain levels after treatment for a couple of hours.   MET    3. Pt to improve 5xSTS from 18.8 sec to 16 sec. MET    4. Pt to improve TUG from 17.8 sec to 16 sec.  ONGOING    LTGS:  1. Pt to improve his time on the 5X Sit to Stand test from 16 sec to 14 sec for greater ease getting in/out of the car. ONGOING, 16.8 sec. w/ B hands on cane     2. Pt to improve trunk and LE strength by 1/2 to 1 MMT grade for ease of getting in/out of the therapy pool to continue with his workouts following PHYSICAL THERAPY.  MET    3. Pt to improve TUG from 16 sec to 14 sec for improved balance and mobility. ONGOING, 18 seconds with cane    4. Pt to be independent with progressive HEP for core and LE strength. MET    5. Patient to be independent with aquatic exercises for balance. ONGOING      Plan  Therapy options: will be seen for skilled therapy services  Frequency: 2x week  Duration in weeks: 6  Treatment plan discussed with: patient      Progress toward previous goals: Partially Met        Recommendations: Continue as planned  Timeframe: 1 month  Prognosis to achieve goals: good    PT Signature: Francisco Martinez, PT  [place] License: [id number]    Timed:  Aquatic Therapy    40     mins 40599    Therapeutic Activities  _____ mins 86759    Self-Care     _____ mins 26137     Untimed;  Group Therapy           _____ mins 99347    Total Timed:               ______ mins    Based upon review of the patient's progress and continued therapy plan, it is my medical opinion that Isauro Sevilla III should continue physical therapy treatment  at [facility]  [facility] PHYSICAL THERAP[facility]  [address] [place] [address]  [phone].  Please fax signed copy to [phone]    Signature: __________________________________  Bryan Mcmullen MD  [email or web address]    Electronically signed by Francisco Martinez, PT, 12/06/24, 10:01 AM EST    DATE TREATMENT INITIATED: 12/6/2024      90 Day Recertification  Certification Period: 3/6/2025  I certify that the therapy services are furnished while this patient is under my care.  The services outlined above are required by this patient, and will be reviewed every 90 days.     PHYSICIAN: Bryan Mcmullen MD   NPI: [id number]                                         DATE:     Please sign and return via fax to [phone] Thank you, [facility] Physical Therapy.

## 2024-12-09 ENCOUNTER — TREATMENT (OUTPATIENT)
Dept: PHYSICAL THERAPY | Facility: CLINIC | Age: 79
End: 2024-12-09
Payer: MEDICARE

## 2024-12-09 DIAGNOSIS — G89.29 CHRONIC BILATERAL LOW BACK PAIN, UNSPECIFIED WHETHER SCIATICA PRESENT: Primary | ICD-10-CM

## 2024-12-09 DIAGNOSIS — M17.12 PRIMARY OSTEOARTHRITIS OF LEFT KNEE: ICD-10-CM

## 2024-12-09 DIAGNOSIS — M54.2 CERVICAL PAIN: ICD-10-CM

## 2024-12-09 DIAGNOSIS — G89.29 CHRONIC PAIN OF BOTH KNEES: ICD-10-CM

## 2024-12-09 DIAGNOSIS — M25.561 CHRONIC PAIN OF BOTH KNEES: ICD-10-CM

## 2024-12-09 DIAGNOSIS — M54.50 CHRONIC BILATERAL LOW BACK PAIN, UNSPECIFIED WHETHER SCIATICA PRESENT: Primary | ICD-10-CM

## 2024-12-09 DIAGNOSIS — Z74.09 IMPAIRED FUNCTIONAL MOBILITY, BALANCE, GAIT, AND ENDURANCE: ICD-10-CM

## 2024-12-09 DIAGNOSIS — M25.562 CHRONIC PAIN OF BOTH KNEES: ICD-10-CM

## 2024-12-09 PROCEDURE — 97113 AQUATIC THERAPY/EXERCISES: CPT | Performed by: PHYSICAL THERAPIST

## 2024-12-09 NOTE — PROGRESS NOTES
"Physical Therapy Daily Treatment Note    [facility]  [address] [place] [address]  [phone] (phone)  [phone] (fax)    Patient: Isauro Sevilla III   : 1945  Diagnosis/ICD-10 Code:  Chronic bilateral low back pain, unspecified whether sciatica present [M54.50, G89.29]  Referring practitioner: Bryan Mcmullen MD  Date of Initial Visit: Type: THERAPY  Noted: 2024  Today's Date: 2024  Patient seen for 12 sessions             Subjective   That stretch for my knee is a life saver.  Is there a way I can do that at home?    Objective     AQUATIC EXERCISE:  Rows w/ closed paddles X 10, then in SLS X 5 each  Paddle Stirs    Shoulder ER/IR w/ closed paddles 15X  Hamstring Stretch w/ solid white Noodle w/ medial bias 20 sec X 2   Solid Noodle Pushdowns X 20  \"\" Fwds 25 ft each  4 Square Stepping X 5 CW and CCW  Walk w/ head turns - Practiced and determined it is no longer needed  Tandem Walk w/o foam dumbbells 25 ft X 4  Balance on one leg w/ No UE support 10+ seconds  Decompression on 2 Noodles in deep end X 3 min.    Education on home hamstring stretch in supine with strap and gentle medial bias.    Assessment/Plan  Discussed transitioning walking to the cooler lap pool for extended walking.  Also discussed addition of dry land therapy to begin to re-incorporate gym machines.  Good improvement in balance, posture and gait stability.            Timed:  Aquatic Therapy    53     mins 15484;    Therapeutic Activities  _____ mins 52975    Self-Care    _____ mins 85665     Untimed;  Group Therapy           _____ mins 43579    Total Timed:               ______ mins    Francisco Martinez, PT  Physical Therapist    [place] License:  [id number]  "

## 2024-12-11 ENCOUNTER — TREATMENT (OUTPATIENT)
Dept: PHYSICAL THERAPY | Facility: CLINIC | Age: 79
End: 2024-12-11
Payer: MEDICARE

## 2024-12-11 DIAGNOSIS — M54.2 CERVICAL PAIN: ICD-10-CM

## 2024-12-11 DIAGNOSIS — M25.561 CHRONIC PAIN OF BOTH KNEES: ICD-10-CM

## 2024-12-11 DIAGNOSIS — Z74.09 IMPAIRED FUNCTIONAL MOBILITY, BALANCE, GAIT, AND ENDURANCE: ICD-10-CM

## 2024-12-11 DIAGNOSIS — M54.50 CHRONIC BILATERAL LOW BACK PAIN, UNSPECIFIED WHETHER SCIATICA PRESENT: Primary | ICD-10-CM

## 2024-12-11 DIAGNOSIS — G89.29 CHRONIC PAIN OF BOTH KNEES: ICD-10-CM

## 2024-12-11 DIAGNOSIS — M25.562 CHRONIC PAIN OF BOTH KNEES: ICD-10-CM

## 2024-12-11 DIAGNOSIS — G89.29 CHRONIC BILATERAL LOW BACK PAIN, UNSPECIFIED WHETHER SCIATICA PRESENT: Primary | ICD-10-CM

## 2024-12-11 DIAGNOSIS — M17.12 PRIMARY OSTEOARTHRITIS OF LEFT KNEE: ICD-10-CM

## 2024-12-11 PROCEDURE — 97113 AQUATIC THERAPY/EXERCISES: CPT | Performed by: PHYSICAL THERAPIST

## 2024-12-11 NOTE — PROGRESS NOTES
"Physical Therapy Daily Treatment Note    [facility]  [address] [place] [address]  [phone] (phone)  [phone] (fax)    Patient: Isauro Sevilla III   : 1945  Diagnosis/ICD-10 Code:  Chronic bilateral low back pain, unspecified whether sciatica present [M54.50, G89.29]  Referring practitioner: Bryan Mcmullen MD  Date of Initial Visit: Type: THERAPY  Noted: 2024  Today's Date: 2024  Patient seen for 13 sessions             Subjective   That knee stretch helps me to be able to walk.    Objective     AQUATIC EXERCISE:  Independent warm up with walking  Rows w/ closed paddles X 10, then in SLS X 10 each  Paddle Stirs  12/12  Shoulder ER/IR w/ closed paddles 15X  Hamstring Stretch w/ solid white Noodle w/ medial bias 20 sec X 2  Quad Stretch w/ solid noodle support at the ankle 20 sec X 2 each  Leg Press w/ 2 solid white Noodles X 25 ea  Uni Clock X 10 ea  Core stabilization -Solid Noodle Pushdowns w/ UE X 20  \"\" Fwds 25 ft each  4 Square Stepping X 5 CW and CCW  Tandem Walk 25 ft X 4  Balance on one leg w/ No UE support 50 seconds on each   Decompression on 2 Noodles in deep end X 3 min.  8 inch Step Ups - Independent       Assessment/Plan  Yayo has made steady progress with aquatic physical therapy.  He is diligent with aquatic exercise on his own  2X/week in addition to his therapy appointments.  It has been a year since he has exercised in the gym, he stopped due to increased neck and back pain.  He is being treated by Pain Management with injections/ablations which have helped him.      Plan: Continue aqua therapy and add Dry land therapy next week for instruction in K-tape to left knee with chronic ITB irritation and trial of limited gym machines.  Has appointment with Pain Management next week as well.         Timed:  Aquatic Therapy    55     mins 12872;    Therapeutic Activities  _____ mins 22574    Self-Care    _____ " mins 52929     Untimed;  Group Therapy           _____ mins 27205    Total Timed:               ______ mins    Francisco Martinez, PT  Physical Therapist    [place] License:  [id number]

## 2024-12-16 ENCOUNTER — TREATMENT (OUTPATIENT)
Dept: PHYSICAL THERAPY | Facility: CLINIC | Age: 79
End: 2024-12-16
Payer: MEDICARE

## 2024-12-16 DIAGNOSIS — M54.50 CHRONIC BILATERAL LOW BACK PAIN, UNSPECIFIED WHETHER SCIATICA PRESENT: Primary | ICD-10-CM

## 2024-12-16 DIAGNOSIS — G89.29 CHRONIC BILATERAL LOW BACK PAIN, UNSPECIFIED WHETHER SCIATICA PRESENT: Primary | ICD-10-CM

## 2024-12-16 DIAGNOSIS — M25.561 CHRONIC PAIN OF BOTH KNEES: ICD-10-CM

## 2024-12-16 DIAGNOSIS — M17.12 PRIMARY OSTEOARTHRITIS OF LEFT KNEE: ICD-10-CM

## 2024-12-16 DIAGNOSIS — M54.2 CERVICAL PAIN: ICD-10-CM

## 2024-12-16 DIAGNOSIS — Z74.09 IMPAIRED FUNCTIONAL MOBILITY, BALANCE, GAIT, AND ENDURANCE: ICD-10-CM

## 2024-12-16 DIAGNOSIS — G89.29 CHRONIC PAIN OF BOTH KNEES: ICD-10-CM

## 2024-12-16 DIAGNOSIS — M25.562 CHRONIC PAIN OF BOTH KNEES: ICD-10-CM

## 2024-12-16 PROCEDURE — 97113 AQUATIC THERAPY/EXERCISES: CPT | Performed by: PHYSICAL THERAPIST

## 2024-12-16 NOTE — PROGRESS NOTES
"Physical Therapy Daily Treatment Note    [facility] Therapy [facility]  [address] [place] [address]  [phone] (phone)  [phone] (fax)    Patient: Isauro Sevilla III   : 1945  Diagnosis/ICD-10 Code:  Chronic bilateral low back pain, unspecified whether sciatica present [M54.50, G89.29]  Referring practitioner: Bryan Mcmullen MD  Date of Initial Visit: Type: THERAPY  Noted: 2024  Today's Date: 2024  Patient seen for 14 sessions             Subjective   Everything has improved, except my left knee still hurts me with walking.  I have some trouble getting up from sitting that has been for years.    Objective     AQUATIC EXERCISE:  Independent warm up with walking  Rows w/ closed paddles  in SLS X 15 each  Paddle Stirs  12/12  Shoulder ER/IR w/ closed paddles standing on one leg 15X  Hamstring Stretch w/ solid white Noodle w/ medial bias 20 sec X 2  Quad Stretch w/ solid noodle support at the ankle 20 sec X 2 each Deferred  Leg Press w/  solid white Noodle X 25 ea  Uni Clock X 10 ea  Core stabilization -Solid Noodle Pushdowns w/ UE X 20  \"\" Fwds 25 ft each  4 Square Stepping X 5 CW and CCW  Tandem Walk 25 ft X 4  Decompression on 2 Noodles in deep end --  8 inch Step Ups - Independent       Assessment/Plan  Yayo has good understanding of the prescribed aquatic exercises, he is also walking in the water for exercise.  Today we worked on challenging his balance with UE resisted exercises while standing on one leg.  I recommended group water classes that he can participate in that would add variety.  He will begin dry land therapy tomorrow for further assessment of his left knee and treatment with possible K tape, home exercises and possibly gym machines.          Timed:  Aquatic Therapy    24     mins 02157;    Therapeutic Activities  _____ mins 75465    Self-Care    _____ mins 89877     Untimed;  Group Therapy           _____ mins 99587    Total " Timed:               ______ mins    Francisco Martinez, PT  Physical Therapist    [place] License:  [id number]

## 2024-12-17 ENCOUNTER — TREATMENT (OUTPATIENT)
Dept: PHYSICAL THERAPY | Facility: CLINIC | Age: 79
End: 2024-12-17
Payer: MEDICARE

## 2024-12-17 DIAGNOSIS — M17.12 PRIMARY OSTEOARTHRITIS OF LEFT KNEE: Primary | ICD-10-CM

## 2024-12-17 DIAGNOSIS — Z74.09 IMPAIRED FUNCTIONAL MOBILITY, BALANCE, GAIT, AND ENDURANCE: ICD-10-CM

## 2024-12-17 PROCEDURE — 97530 THERAPEUTIC ACTIVITIES: CPT | Performed by: PHYSICAL THERAPIST

## 2024-12-17 PROCEDURE — 97112 NEUROMUSCULAR REEDUCATION: CPT | Performed by: PHYSICAL THERAPIST

## 2024-12-17 PROCEDURE — 97110 THERAPEUTIC EXERCISES: CPT | Performed by: PHYSICAL THERAPIST
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
## 2024-12-20 ENCOUNTER — TREATMENT (OUTPATIENT)
Dept: PHYSICAL THERAPY | Facility: CLINIC | Age: 79
End: 2024-12-20
Payer: MEDICARE

## 2024-12-20 DIAGNOSIS — G89.29 CHRONIC PAIN OF BOTH KNEES: ICD-10-CM

## 2024-12-20 DIAGNOSIS — M17.12 PRIMARY OSTEOARTHRITIS OF LEFT KNEE: Primary | ICD-10-CM

## 2024-12-20 DIAGNOSIS — M54.2 CERVICAL PAIN: ICD-10-CM

## 2024-12-20 DIAGNOSIS — M54.50 CHRONIC BILATERAL LOW BACK PAIN, UNSPECIFIED WHETHER SCIATICA PRESENT: ICD-10-CM

## 2024-12-20 DIAGNOSIS — M25.561 CHRONIC PAIN OF BOTH KNEES: ICD-10-CM

## 2024-12-20 DIAGNOSIS — Z74.09 IMPAIRED FUNCTIONAL MOBILITY, BALANCE, GAIT, AND ENDURANCE: ICD-10-CM

## 2024-12-20 DIAGNOSIS — G89.29 CHRONIC BILATERAL LOW BACK PAIN, UNSPECIFIED WHETHER SCIATICA PRESENT: ICD-10-CM

## 2024-12-20 DIAGNOSIS — M25.562 CHRONIC PAIN OF BOTH KNEES: ICD-10-CM

## 2024-12-20 PROCEDURE — 97113 AQUATIC THERAPY/EXERCISES: CPT | Performed by: PHYSICAL THERAPIST

## 2024-12-20 NOTE — PROGRESS NOTES
"Physical Therapy Daily Treatment Note    [facility]  [address] [place] [address]  [phone] (phone)  [phone] (fax)    Patient: Isauro Sevilla III   : 1945  Diagnosis/ICD-10 Code:  Primary osteoarthritis of left knee [M17.12]  Referring practitioner: Bryan Mcmullen MD  Date of Initial Visit: Type: THERAPY  Noted: 2024  Today's Date: 2024  Patient seen for 16 sessions             Subjective   Taping knee helped until he removed the tape.    Objective     AQUATIC EXERCISE:  Independent warm up with walking  Rows w/ closed paddles  in SLS X 15 each  Paddle Stirs  12/12  Shoulder ER/IR w/ closed paddles standing on one leg 15X  Hamstring Stretch w/ solid white Noodle w/ medial bias 20 sec X 2  Quad Stretch w/ solid noodle support at the ankle 20 sec X 2 each   One Leg Balance in shallow  6 sec each  Leg Press w/  solid white Noodle plus foam ring X 25 ea  Core stabilization -Solid Noodle Pushdowns w/ UEs X 20  \"\" Fwds 25 ft each  4 Square Stepping X 5 CW and CCW  Tandem Walk 25 ft X 4  Tuck Ups  Independent- verbal review  Decompression on 2 Noodles in deep end --  8 inch Step Ups - Independent         Assessment/Plan  Yayo had positive response to K taping on his knees.  He demonstrates good understanding of his prescribed aquatic exercises.  Balance continues to improve.  Plan: One more aqua session, then transition to dry land physical therapy for further instruction in self care.          Timed:  Aquatic Therapy    40     mins 79552;    Therapeutic Activities  _____ mins 68983    Self-Care    _____ mins 75714     Untimed;  Group Therapy           _____ mins 43744    Total Timed:               ______ mins    Francisco Martinez, PT  Physical Therapist    [place] License:  [id number]  "

## 2024-12-23 ENCOUNTER — TREATMENT (OUTPATIENT)
Dept: PHYSICAL THERAPY | Facility: CLINIC | Age: 79
End: 2024-12-23
Payer: MEDICARE

## 2024-12-23 DIAGNOSIS — M25.562 CHRONIC PAIN OF BOTH KNEES: ICD-10-CM

## 2024-12-23 DIAGNOSIS — M54.2 CERVICAL PAIN: ICD-10-CM

## 2024-12-23 DIAGNOSIS — G89.29 CHRONIC PAIN OF BOTH KNEES: ICD-10-CM

## 2024-12-23 DIAGNOSIS — M25.561 CHRONIC PAIN OF BOTH KNEES: ICD-10-CM

## 2024-12-23 DIAGNOSIS — G89.29 CHRONIC BILATERAL LOW BACK PAIN, UNSPECIFIED WHETHER SCIATICA PRESENT: ICD-10-CM

## 2024-12-23 DIAGNOSIS — M54.50 CHRONIC BILATERAL LOW BACK PAIN, UNSPECIFIED WHETHER SCIATICA PRESENT: ICD-10-CM

## 2024-12-23 DIAGNOSIS — M17.12 PRIMARY OSTEOARTHRITIS OF LEFT KNEE: Primary | ICD-10-CM

## 2024-12-23 DIAGNOSIS — Z74.09 IMPAIRED FUNCTIONAL MOBILITY, BALANCE, GAIT, AND ENDURANCE: ICD-10-CM

## 2024-12-23 PROCEDURE — 97113 AQUATIC THERAPY/EXERCISES: CPT | Performed by: PHYSICAL THERAPIST

## 2024-12-23 NOTE — PROGRESS NOTES
"Physical Therapy Daily Treatment Note    [facility]  [address] [place] [address]  [phone] (phone)  [phone] (fax)    Patient: Isauro Sevilla III   : 1945  Diagnosis/ICD-10 Code:  Primary osteoarthritis of left knee [M17.12]  Referring practitioner: Bryan Mcmullen MD  Date of Initial Visit: Type: THERAPY  Noted: 2024  Today's Date: 2024  Patient seen for 17 sessions             Subjective   My left knee has been hurting since I took the tape off.  When I walk every once in a while it gets up to an 8/10.  So, I'm still using the cane.    Objective     AQUATIC EXERCISE:  Independent warm up with walking  Wall Crawl (BKTC) Stretch 20 sec X 3  Rows w/ closed paddles  X 15 each  Paddle Stirs  /12  Shoulder ER/IR w/ closed paddles 15X  Hamstring Stretch w/ solid white Noodle w/ medial bias 20 sec X 4  Quad Stretch w/ solid noodle support at the ankle --  Leg Press w/  solid white Noodle plus foam ring 15X  Core stabilization -Solid Noodle Pushdowns w/ UEs X 20  \"\" Fwds 25 ft   4 Square Stepping X 5 CW and CCW  Tandem Walk --  Tuck Ups  Independent- verbal review  Decompression on 2 Noodles in deep end --  8 inch Step Ups Hold    Assessment/Plan  Increased left knee pain periodically with walking.  Plan: Patient plans to call his Orthopedic Dr regarding possible gel injections. Transition to dry land therapy.  Yayo will continue the prescribed aquatic exercise on his own and may participate in gentle group classes.        Timed:  Aquatic Therapy    30     mins 17196;    Therapeutic Activities  _____ mins 37575    Self-Care    _____ mins 73700     Untimed;  Group Therapy           _____ mins 90745    Total Timed:               ______ mins    Francisco Martinez, PT  Physical Therapist    [place] License:  [id number]  "

## 2025-01-09 ENCOUNTER — TREATMENT (OUTPATIENT)
Dept: PHYSICAL THERAPY | Facility: CLINIC | Age: 80
End: 2025-01-09
Payer: MEDICARE

## 2025-01-09 DIAGNOSIS — G89.29 CHRONIC PAIN OF BOTH KNEES: ICD-10-CM

## 2025-01-09 DIAGNOSIS — M25.562 CHRONIC PAIN OF BOTH KNEES: ICD-10-CM

## 2025-01-09 DIAGNOSIS — Z74.09 IMPAIRED FUNCTIONAL MOBILITY, BALANCE, GAIT, AND ENDURANCE: ICD-10-CM

## 2025-01-09 DIAGNOSIS — M17.11 PRIMARY OSTEOARTHRITIS OF RIGHT KNEE: ICD-10-CM

## 2025-01-09 DIAGNOSIS — M17.12 PRIMARY OSTEOARTHRITIS OF LEFT KNEE: Primary | ICD-10-CM

## 2025-01-09 DIAGNOSIS — M25.561 CHRONIC PAIN OF BOTH KNEES: ICD-10-CM

## 2025-01-13 ENCOUNTER — TREATMENT (OUTPATIENT)
Dept: PHYSICAL THERAPY | Facility: CLINIC | Age: 80
End: 2025-01-13
Payer: MEDICARE

## 2025-01-13 DIAGNOSIS — M17.12 PRIMARY OSTEOARTHRITIS OF LEFT KNEE: Primary | ICD-10-CM

## 2025-01-13 DIAGNOSIS — M25.561 CHRONIC PAIN OF BOTH KNEES: ICD-10-CM

## 2025-01-13 DIAGNOSIS — Z74.09 IMPAIRED FUNCTIONAL MOBILITY, BALANCE, GAIT, AND ENDURANCE: ICD-10-CM

## 2025-01-13 DIAGNOSIS — M25.562 CHRONIC PAIN OF BOTH KNEES: ICD-10-CM

## 2025-01-13 DIAGNOSIS — G89.29 CHRONIC PAIN OF BOTH KNEES: ICD-10-CM

## 2025-01-13 DIAGNOSIS — M17.11 PRIMARY OSTEOARTHRITIS OF RIGHT KNEE: ICD-10-CM

## 2025-01-13 PROCEDURE — 97530 THERAPEUTIC ACTIVITIES: CPT | Performed by: PHYSICAL THERAPIST

## 2025-01-13 PROCEDURE — 97112 NEUROMUSCULAR REEDUCATION: CPT | Performed by: PHYSICAL THERAPIST

## 2025-01-13 PROCEDURE — 97110 THERAPEUTIC EXERCISES: CPT | Performed by: PHYSICAL THERAPIST

## 2025-01-13 NOTE — PROGRESS NOTES
Physical Therapy Daily Note    Patient Name: Isauro Sevilla III         :  1945  Referring Physician: Bryan Mcmullen MD  Treatment Date: 2025  Date of Initial Visit: No linked episodes    Visit Diagnoses:    ICD-10-CM ICD-9-CM   1. Primary osteoarthritis of left knee  M17.12 715.16   2. Impaired functional mobility, balance, gait, and endurance  Z74.09 V49.89   3. Chronic pain of both knees  M25.561 719.46    M25.562 338.29    G89.29    4. Primary osteoarthritis of right knee  M17.11 715.16     Patient seen for Visit count could not be calculated. Make sure you are using a visit which is associated with an episode. sessions  _____________________________________________________    Subjective   Isauro Sveilla III reports: knees hurt a lot after last session - thinks it was cold weather - tape helpful     Objective   Pt ambulating w/ st cane w/ wide base short stride gait - flexed posture -     TREATMENT:   EXERCISES:   []   GLUTE SETS (B) ; (R/L) 10/10 sec ea  []   BRIDGES on HEELS 15/5 sec ea  [x]   SIT to STAND x 15 (23 in from floor)   [x]   SIDE STEPPING 30ft ea  [x]   MONSTER WALK Fwd/Retro 30 ft ea  w/ SBA  [x]   RAFFAELE LEG PRESS 160# 2x 20 (w/ bars lifted)  [x]   RAFFAELE HIP ABDuction 75# 2x 15   [x]   RUNNER STEP UP; (hole 3 from bottom) alternating legs x 15 ea  [x]   NUSTEP Seat 11 Arms 11 Level 7 x 8 min  []   FABRICATED HEP and REVIEWED w/ PATIENT      NMR: Verbal and tactile cues to facilitate core, posture,  quads/VMO, glutes, hip / LE musculature statically and dynamically. - Balance / proprioception activities -  - ;     Functional / Therapeutic Activities:    TAPING / BRACING: K-tape to Unload PF jt (B) knees (2 layers (L) knee)  SEE EXERCISEs -   FUNCTIONAL ASSESSMENT     5x SIT TO STAND: 17 sec  TUG TEST: 14 sec w/ cane      Assessment/Plan  80 yo male; L>R KNEE PAIN; (L) KNEE OA, PF jt pain/OA; (R) KNEE h/o TKA ; Multiple (R) knee surgeries -   Pt would benefit from further assessment  of (L) knee to assess state of OA, rule out possible MMT -      Taping greatly reduced (B) knee pain, allowing improved gait, function, tolerance to PT activities     GOAL STATUS:   Goals  Plan Goals: STGs:  1. Patient will be independent with education for symptom management, joint protection and strategies to minimize stress on affected tissues. MET     2. Pt is able to tolerate 30 min of aquatic therapy with reports of reduced pain levels after treatment for a couple of hours.   MET     3. Pt to improve 5xSTS from 18.8 sec to 16 sec. -MET on 12-6-24,      4. Pt to improve TUG from 17.8 sec to 16 sec.  ONGOING     LTGS:  1. Pt to improve his time on the 5X Sit to Stand test from 16 sec to 14 sec for greater ease getting in/out of the car. ONGOING, 17 sec w/ minimal use of UEs     2. Pt to improve trunk and LE strength by 1/2 to 1 MMT grade for ease of getting in/out of the therapy pool to continue with his workouts following PHYSICAL THERAPY.  MET     3. Pt to improve TUG from 16 sec to 14 sec for improved balance and mobility.  with cane - MET- 14sec w/ cane     4. Pt to be independent with progressive HEP for core and LE strength. -LAND - ONGOING     5. Patient to be independent with aquatic exercises for balance. MET    6) Able to ambulate up/down 2 flights of stairs reciprocally w/ HR and knees w/wo tape - ONGOING    Isauro would benefit from continued Physical therapy for safe and full return to ADLs and hobby activities w/ wo AD and modifications prn -     Progress strengthening /stabilization /functional activity - land 2x/week x 6 weeks - work on stairs and balance next session -        _________________________________________________    Manual Therapy:                 mins  35245;  Therapeutic Exercise:    20    mins  58100;     Neuromuscular Devonte:   05     mins  85982;    Therapeutic Activity:     28      mins  07452;     Ultrasound:                          mins  53143;  Iontophoresis:                      mins  10521;    Electrical Stimulation:         mins  17613 ( );  Mechanical Traction:          mins  81074  Dry Needling                       mins self-pay     Timed Treatment:   53    mins                  Total Treatment:     53    mins        Robbi Szymanski PT  Physical Therapist  Lic # [id number]

## 2025-01-15 ENCOUNTER — TREATMENT (OUTPATIENT)
Dept: PHYSICAL THERAPY | Facility: CLINIC | Age: 80
End: 2025-01-15
Payer: MEDICARE

## 2025-01-15 DIAGNOSIS — M17.11 PRIMARY OSTEOARTHRITIS OF RIGHT KNEE: ICD-10-CM

## 2025-01-15 DIAGNOSIS — M25.562 CHRONIC PAIN OF BOTH KNEES: ICD-10-CM

## 2025-01-15 DIAGNOSIS — G89.29 CHRONIC PAIN OF BOTH KNEES: ICD-10-CM

## 2025-01-15 DIAGNOSIS — M25.561 CHRONIC PAIN OF BOTH KNEES: ICD-10-CM

## 2025-01-15 DIAGNOSIS — Z74.09 IMPAIRED FUNCTIONAL MOBILITY, BALANCE, GAIT, AND ENDURANCE: ICD-10-CM

## 2025-01-15 DIAGNOSIS — M17.12 PRIMARY OSTEOARTHRITIS OF LEFT KNEE: Primary | ICD-10-CM

## 2025-01-15 PROCEDURE — 97110 THERAPEUTIC EXERCISES: CPT | Performed by: PHYSICAL THERAPIST

## 2025-01-15 PROCEDURE — 97530 THERAPEUTIC ACTIVITIES: CPT | Performed by: PHYSICAL THERAPIST

## 2025-01-15 NOTE — PROGRESS NOTES
Physical Therapy Daily Note    Patient Name: Isauro Sevilla III         :  1945  Referring Physician: Bryan Mcmullen MD  Treatment Date: 1/15/2025  Date of Initial Visit: Type: THERAPY  Noted: 2024    Visit Diagnoses:    ICD-10-CM ICD-9-CM   1. Primary osteoarthritis of left knee  M17.12 715.16   2. Impaired functional mobility, balance, gait, and endurance  Z74.09 V49.89   3. Chronic pain of both knees  M25.561 719.46    M25.562 338.29    G89.29    4. Primary osteoarthritis of right knee  M17.11 715.16     Patient seen for 20 sessions  _____________________________________________________    Subjective   Isauro Sevilla III reports: much less knee pain w/ taping - able to walk and rise w/ much less pain -     Objective   Pts spouse her to be instructed in taping -   Pt ambulating w/ st cane w/ shortened stride length -     TREATMENT:   EXERCISES:   []   GLUTE SETS (B) ; (R/L) 10/10 sec ea  []   BRIDGES on HEELS 15/5 sec ea  [x]   SIT to STAND x 15 std chair (18 inches)   [x]   SIDE STEPPING 30ft ea  [x]   MONSTER WALK Fwd/Retro 40 ft ea  w/ SBA  [x]   RAFFAELE LEG PRESS 175# 2x 20 (w/ bars lifted)  [x]   RAFFAELE HIP ABDuction 85# 2x 15   [x]   RUNNER STEP UP; (hole 3 from bottom) alternating legs x 15 ea  [x]   UP/DOWN 2 FLIGHTS of STAIRS w/ HR and st cane Reciprocally - w/ SBA  [x]   AMBULATE TRACK x 2 laps total w/ emphasis on longer stride length  [x]   NUSTEP Seat 11 Arms 11 Level 7 x 8 min  []   FABRICATED HEP and REVIEWED w/ PATIENT      NMR: Verbal and tactile cues to facilitate core, posture,  quads/VMO, glutes, hip / LE musculature statically and dynamically. - Balance / proprioception activities -  - ;     Functional / Therapeutic Activities:    TAPING / BRACING: K-tape to Unload PF jt (B) knees (2 layers )  Worked closely w/ Pts spouse to teach her how to tape her  -   SEE EXERCISEs -     Assessment/Plan  80 yo male; L>R KNEE PAIN; (L) KNEE OA, PF jt pain/OA; (R) KNEE h/o TKA ; Multiple  (R) knee surgeries -   Pt would benefit from further assessment of (L) knee to assess state of OA, rule out possible MMT -      Taping greatly reduced (B) knee pain, allowing improved gait, function, tolerance to PT activities      Pt able to ambulate up/down stairs reciprocally w/ HR and cane w/o pain with taping -     Progress strengthening /stabilization /functional activity       _________________________________________________    Manual Therapy:                 mins  02002;  Therapeutic Exercise:    15    mins  81901;     Neuromuscular Devonte:   05     mins  93425;    Therapeutic Activity:     25      mins  20316;     Ultrasound:                          mins  94389;  Iontophoresis:                     mins  12606;    Electrical Stimulation:         mins  79822 ( );  Mechanical Traction:          mins  10855  Dry Needling                       mins self-pay     Timed Treatment:   45    mins                  Total Treatment:     45    mins        Robbi Szymanski, PT  Physical Therapist  Lic # [id number]

## 2025-01-21 ENCOUNTER — TREATMENT (OUTPATIENT)
Dept: PHYSICAL THERAPY | Facility: CLINIC | Age: 80
End: 2025-01-21
Payer: MEDICARE

## 2025-01-21 DIAGNOSIS — G89.29 CHRONIC PAIN OF BOTH KNEES: ICD-10-CM

## 2025-01-21 DIAGNOSIS — M25.562 CHRONIC PAIN OF BOTH KNEES: ICD-10-CM

## 2025-01-21 DIAGNOSIS — M25.561 CHRONIC PAIN OF BOTH KNEES: ICD-10-CM

## 2025-01-21 DIAGNOSIS — M17.12 PRIMARY OSTEOARTHRITIS OF LEFT KNEE: Primary | ICD-10-CM

## 2025-01-21 DIAGNOSIS — G89.29 CHRONIC BILATERAL LOW BACK PAIN, UNSPECIFIED WHETHER SCIATICA PRESENT: ICD-10-CM

## 2025-01-21 DIAGNOSIS — M17.11 PRIMARY OSTEOARTHRITIS OF RIGHT KNEE: ICD-10-CM

## 2025-01-21 DIAGNOSIS — M54.50 CHRONIC BILATERAL LOW BACK PAIN, UNSPECIFIED WHETHER SCIATICA PRESENT: ICD-10-CM

## 2025-01-21 DIAGNOSIS — Z74.09 IMPAIRED FUNCTIONAL MOBILITY, BALANCE, GAIT, AND ENDURANCE: ICD-10-CM

## 2025-01-21 PROCEDURE — 97110 THERAPEUTIC EXERCISES: CPT | Performed by: PHYSICAL THERAPIST

## 2025-01-21 PROCEDURE — 97530 THERAPEUTIC ACTIVITIES: CPT | Performed by: PHYSICAL THERAPIST

## 2025-01-21 PROCEDURE — 97112 NEUROMUSCULAR REEDUCATION: CPT | Performed by: PHYSICAL THERAPIST

## 2025-01-21 NOTE — PROGRESS NOTES
Physical Therapy Daily Note    Patient Name: Isauro Sevilla III         :  1945  Referring Physician: Bryan Mcmullen MD  Treatment Date: 2025  Date of Initial Visit: Type: THERAPY  Noted: 2024    Visit Diagnoses:    ICD-10-CM ICD-9-CM   1. Primary osteoarthritis of left knee  M17.12 715.16   2. Impaired functional mobility, balance, gait, and endurance  Z74.09 V49.89   3. Chronic pain of both knees  M25.561 719.46    M25.562 338.29    G89.29    4. Primary osteoarthritis of right knee  M17.11 715.16   5. Chronic bilateral low back pain, unspecified whether sciatica present  M54.50 724.2    G89.29 338.29     Patient seen for 21 sessions  _____________________________________________________    Subjective   Isauro Sevilla III reports: did well after last session- taping helpful -     Objective   Ambulates w/ st cane w/ small stride length  Improved w/o AD and arm swing w/ cuing -     TREATMENT:   EXERCISES:   []   GLUTE SETS (B) ; (R/L) 10/10 sec ea  []   BRIDGES on HEELS 15/5 sec ea  [x]   SIT to STAND x 15 std chair (18 inches)   [x]   SIDE STEPPING 30ft ea  [x]   MONSTER WALK Fwd/Retro 40 ft ea  w/ SBA  [x]   RAFFAELE LEG PRESS 175# 2x 20 (w/ bars lifted)  [x]   RAFFAELE HIP ABDuction 85# 2x 15   [x]   RUNNER STEP UP; (hole 3 from bottom) alternating legs x 15 ea  [x]   UP/DOWN 2 FLIGHTS of STAIRS w/ HR and st cane Reciprocally - w/ SBA  [x]   AMBULATE TRACK x 2 laps total w/ emphasis on longer stride length and arm swing w/o AD  [x]   NUSTEP Seat 11 Arms 11 Level 7 x 8 min  []   FABRICATED HEP and REVIEWED w/ PATIENT      NMR: Verbal and tactile cues to facilitate core, posture,  quads/VMO, glutes, hip / LE musculature statically and dynamically. - Balance / proprioception activities -  - ;     Functional / Therapeutic Activities:    TAPING / BRACING: K-tape to Unload PF jt (B) knees (2 layers )  SEE EXERCISEs -        Assessment/Plan  78 yo male; L>R KNEE PAIN; (L) KNEE OA, PF jt pain/OA; (R) KNEE h/o  TKA ; Multiple (R) knee surgeries -   Pt would benefit from further assessment of (L) knee to assess state of OA, rule out possible MMT -      Taping greatly reduced (B) knee pain, allowing improved gait, function, tolerance to PT activities       Pt able to ambulate up/down stairs reciprocally w/ HR and cane w/o pain with taping -     Progress strengthening /stabilization /functional activity and taping -        _________________________________________________    Manual Therapy:                 mins  14575;  Therapeutic Exercise:    20    mins  90632;     Neuromuscular Devonte:   05     mins  62015;    Therapeutic Activity:     20      mins  82257;     Ultrasound:                          mins  46824;  Iontophoresis:                     mins  88028;    Electrical Stimulation:         mins  34088 ( );  Mechanical Traction:          mins  47187  Dry Needling                       mins self-pay     Timed Treatment:   45    mins                  Total Treatment:     45    mins        Robbi Szymanski PT  Physical Therapist  Lic # [id number]

## 2025-01-23 ENCOUNTER — TREATMENT (OUTPATIENT)
Dept: PHYSICAL THERAPY | Facility: CLINIC | Age: 80
End: 2025-01-23
Payer: MEDICARE

## 2025-01-23 DIAGNOSIS — G89.29 CHRONIC BILATERAL LOW BACK PAIN, UNSPECIFIED WHETHER SCIATICA PRESENT: ICD-10-CM

## 2025-01-23 DIAGNOSIS — M17.12 PRIMARY OSTEOARTHRITIS OF LEFT KNEE: Primary | ICD-10-CM

## 2025-01-23 DIAGNOSIS — M54.50 CHRONIC BILATERAL LOW BACK PAIN, UNSPECIFIED WHETHER SCIATICA PRESENT: ICD-10-CM

## 2025-01-23 DIAGNOSIS — M17.11 PRIMARY OSTEOARTHRITIS OF RIGHT KNEE: ICD-10-CM

## 2025-01-23 DIAGNOSIS — Z74.09 IMPAIRED FUNCTIONAL MOBILITY, BALANCE, GAIT, AND ENDURANCE: ICD-10-CM

## 2025-01-23 DIAGNOSIS — M25.561 CHRONIC PAIN OF BOTH KNEES: ICD-10-CM

## 2025-01-23 DIAGNOSIS — M25.562 CHRONIC PAIN OF BOTH KNEES: ICD-10-CM

## 2025-01-23 DIAGNOSIS — G89.29 CHRONIC PAIN OF BOTH KNEES: ICD-10-CM

## 2025-01-23 NOTE — PROGRESS NOTES
Physical Therapy Daily Note    Patient Name: Isauro Sevilla III         :  1945  Referring Physician: Bryan Mcmullen MD  Treatment Date: 2025  Date of Initial Visit: Type: THERAPY  Noted: 2024    Visit Diagnoses:    ICD-10-CM ICD-9-CM   1. Primary osteoarthritis of left knee  M17.12 715.16   2. Impaired functional mobility, balance, gait, and endurance  Z74.09 V49.89   3. Chronic pain of both knees  M25.561 719.46    M25.562 338.29    G89.29    4. Primary osteoarthritis of right knee  M17.11 715.16   5. Chronic bilateral low back pain, unspecified whether sciatica present  M54.50 724.2    G89.29 338.29     Patient seen for 22 sessions  _____________________________________________________    Subjective   Isauro Sevilla III reports: tape very helpful - no pain w/ stairs with taping -     Objective   Pt presents with his wife present - Pt ambulates w/ st cane - able to ambulate w/o AD - both ways, Pt ambulates w/ very small strides and limited arm swing -   Apparent tightness of HF and Hip adductors     TREATMENT:   EXERCISES:   []   GLUTE SETS (B) ; (R/L) 10/10 sec ea  []   BRIDGES on HEELS 15/5 sec ea  [x]   SIT to STAND x 15 std chair (18 inches)   [x]   SIDE STEPPING 30ft ea  [x]   MONSTER WALK Fwd/Retro 40 ft ea  w/ SBA  [x]   RAFFAELE LEG PRESS 175# 2x 20 (w/ bars lifted)  [x]   RAFFAELE HIP ABDuction 85# 2x 15   [x]   RUNNER STEP UP; (hole 3 from bottom) alternating legs x 15 ea  [x]   UP/DOWN 2 FLIGHTS of STAIRS w/ HR and st cane Reciprocally - w/ SBA  [x]   SEATED HS CURL (seat 7) 30# x 20  [x]   AMBULATE TRACK x 2 laps total w/ emphasis on longer stride length and arm swing w/o AD  [x]   NUSTEP Seat 11 Arms 11 Level 7 x 8 min  []   FABRICATED HEP and REVIEWED w/ PATIENT      NMR: Verbal and tactile cues to facilitate core, posture,  quads/VMO, glutes, hip / LE musculature statically and dynamically. - Balance / proprioception activities -  - ;cuing for longer stride length in gait -      Functional / Therapeutic Activities:    TAPING / BRACING: K-tape to Unload PF jt (B) knees (2 layers )  SEE EXERCISEs -         Assessment/Plan  78 yo male; L>R KNEE PAIN; (L) KNEE OA, PF jt pain/OA; (R) KNEE h/o TKA ; Multiple (R) knee surgeries -   Pt would benefit from further assessment of (L) knee to assess state of OA, rule out possible MMT -      Taping greatly reduced (B) knee pain, allowing improved gait, function, tolerance to PT activities       Pt able to ambulate up/down stairs reciprocally w/ HR and cane w/o pain with taping -    Progress strengthening /stabilization /functional activity - ADD HF and ADDuctor stretches next session -        _________________________________________________    Manual Therapy:                 mins  45465;  Therapeutic Exercise:    20    mins  57807;     Neuromuscular Devonte:   05     mins  99956;    Therapeutic Activity:     20      mins  58462;     Ultrasound:                          mins  42115;  Iontophoresis:                     mins  00593;    Electrical Stimulation:         mins  17661 ( );  Mechanical Traction:          mins  98780  Dry Needling                       mins self-pay     Timed Treatment:   45    mins                  Total Treatment:     45    mins        Robbi Szymanski, PT  Physical Therapist  Lic # [id number]

## 2025-01-27 ENCOUNTER — TREATMENT (OUTPATIENT)
Dept: PHYSICAL THERAPY | Facility: CLINIC | Age: 80
End: 2025-01-27
Payer: MEDICARE

## 2025-01-27 DIAGNOSIS — M25.561 CHRONIC PAIN OF BOTH KNEES: ICD-10-CM

## 2025-01-27 DIAGNOSIS — Z74.09 IMPAIRED FUNCTIONAL MOBILITY, BALANCE, GAIT, AND ENDURANCE: ICD-10-CM

## 2025-01-27 DIAGNOSIS — G89.29 CHRONIC PAIN OF BOTH KNEES: ICD-10-CM

## 2025-01-27 DIAGNOSIS — M25.562 CHRONIC PAIN OF BOTH KNEES: ICD-10-CM

## 2025-01-27 DIAGNOSIS — M17.11 PRIMARY OSTEOARTHRITIS OF RIGHT KNEE: ICD-10-CM

## 2025-01-27 DIAGNOSIS — M17.12 PRIMARY OSTEOARTHRITIS OF LEFT KNEE: Primary | ICD-10-CM

## 2025-01-27 PROCEDURE — 97112 NEUROMUSCULAR REEDUCATION: CPT | Performed by: PHYSICAL THERAPIST

## 2025-01-27 PROCEDURE — 97530 THERAPEUTIC ACTIVITIES: CPT | Performed by: PHYSICAL THERAPIST

## 2025-01-27 PROCEDURE — 97110 THERAPEUTIC EXERCISES: CPT | Performed by: PHYSICAL THERAPIST

## 2025-01-27 NOTE — PROGRESS NOTES
Physical Therapy Daily Note    Patient Name: Isauro Sevilla III         :  1945  Referring Physician: Bryan Mcmullen MD  Treatment Date: 2025  Date of Initial Visit: Type: THERAPY  Noted: 2024    Visit Diagnoses:    ICD-10-CM ICD-9-CM   1. Primary osteoarthritis of left knee  M17.12 715.16   2. Impaired functional mobility, balance, gait, and endurance  Z74.09 V49.89   3. Chronic pain of both knees  M25.561 719.46    M25.562 338.29    G89.29    4. Primary osteoarthritis of right knee  M17.11 715.16     Patient seen for 23 sessions  _____________________________________________________    Subjective   Isauro Sevilla III reports: tape very helpful in reducing his knee pain allowing him ro walk and do stairs much easier and w/ less pain -     Objective   Pt ambulating w/ st cane with very short stride length - unsteady today ambulating down stairs reciprocally w/o cane - better w/ cane   Much cuing to increase stride length - working w/o st cane     TREATMENT:   EXERCISES:   [x]   HIP ADD / BUTTERFLY STRETCHES  2 min  [x]   SUPINE HIP FLEXOR STRETCHES off SIDE of BED  2 min ea  []   SIT to STAND x 15 std chair (18 inches)   [x]   SIDE STEPPING 30ft ea  [x]   MONSTER WALK Fwd/Retro 40 ft ea  w/ SBA  [x]   RAFFAELE LEG PRESS 175# 2x 20 (w/ bars lifted)  [x]   RAFFAELE HIP ABDuction 85# 2x 15   []   RUNNER STEP UP; (hole 3 from bottom) alternating legs x 15 ea  [x]   UP/DOWN 2 FLIGHTS of STAIRS w/ HR and st cane Reciprocally - w/ SBA  [x]   SEATED HS CURL (seat 7) 30# x 20  [x]   AMBULATE TRACK x 2 laps total w/ emphasis on longer stride length and arm swing w/o AD  [x]   NUSTEP Seat 11 Arms 11 Level 9 x 10 min  []   FABRICATED HEP and REVIEWED w/ PATIENT      NMR: Verbal and tactile cues to facilitate core, posture,  quads/VMO, glutes, hip / LE musculature statically and dynamically. - Balance / proprioception activities -  - ;cuing for longer stride length in gait -     Functional / Therapeutic Activities:     TAPING / BRACING: K-tape to Unload PF jt (B) knees (2 layers )  SEE EXERCISEs -        Assessment/Plan  78 yo male; L>R KNEE PAIN; (L) KNEE OA, PF jt pain/OA; (R) KNEE h/o TKA ; Multiple (R) knee surgeries -   Pt would benefit from further assessment of (L) knee to assess state of OA, rule out possible MMT -      Taping greatly reduced (B) knee pain, allowing improved gait, function, tolerance to PT activities       Pt able to ambulate up/down stairs reciprocally w/ HR and cane w/o pain with taping -    Progress strengthening /stabilization /functional activity       _________________________________________________    Manual Therapy:                 mins  43258;  Therapeutic Exercise:    20    mins  18588;     Neuromuscular Devonte:   05     mins  77943;    Therapeutic Activity:     20      mins  05912;     Ultrasound:                          mins  40493;  Iontophoresis:                     mins  62107;    Electrical Stimulation:         mins  23647 ( );  Mechanical Traction:          mins  54013  Dry Needling                       mins self-pay     Timed Treatment:   45    mins                  Total Treatment:     45    mins        Robbi Szymanski PT  Physical Therapist  Lic # [id number]

## 2025-01-29 ENCOUNTER — TREATMENT (OUTPATIENT)
Dept: PHYSICAL THERAPY | Facility: CLINIC | Age: 80
End: 2025-01-29
Payer: MEDICARE

## 2025-01-29 DIAGNOSIS — M25.562 CHRONIC PAIN OF BOTH KNEES: ICD-10-CM

## 2025-01-29 DIAGNOSIS — Z74.09 IMPAIRED FUNCTIONAL MOBILITY, BALANCE, GAIT, AND ENDURANCE: ICD-10-CM

## 2025-01-29 DIAGNOSIS — M54.50 CHRONIC BILATERAL LOW BACK PAIN, UNSPECIFIED WHETHER SCIATICA PRESENT: ICD-10-CM

## 2025-01-29 DIAGNOSIS — M17.11 PRIMARY OSTEOARTHRITIS OF RIGHT KNEE: ICD-10-CM

## 2025-01-29 DIAGNOSIS — M25.561 CHRONIC PAIN OF BOTH KNEES: ICD-10-CM

## 2025-01-29 DIAGNOSIS — M17.12 PRIMARY OSTEOARTHRITIS OF LEFT KNEE: Primary | ICD-10-CM

## 2025-01-29 DIAGNOSIS — G89.29 CHRONIC BILATERAL LOW BACK PAIN, UNSPECIFIED WHETHER SCIATICA PRESENT: ICD-10-CM

## 2025-01-29 DIAGNOSIS — G89.29 CHRONIC PAIN OF BOTH KNEES: ICD-10-CM

## 2025-01-30 NOTE — PROGRESS NOTES
aPhysical Therapy Daily Note    Patient Name: Isauro Sevilla III         :  1945  Referring Physician: Bryan Mcmullen MD  Treatment Date: 2025  Date of Initial Visit: Type: THERAPY  Noted: 2024    Visit Diagnoses:    ICD-10-CM ICD-9-CM   1. Primary osteoarthritis of left knee  M17.12 715.16   2. Impaired functional mobility, balance, gait, and endurance  Z74.09 V49.89   3. Chronic pain of both knees  M25.561 719.46    M25.562 338.29    G89.29    4. Primary osteoarthritis of right knee  M17.11 715.16   5. Chronic bilateral low back pain, unspecified whether sciatica present  M54.50 724.2    G89.29 338.29     Patient seen for 24 sessions  _____________________________________________________    Subjective   Isauro Sevilla III reports: tape very helplful -    Objective   Pt ambulating w/ st cane with very short stride length - unsteady today ambulating down stairs reciprocally w/o cane - better w/ cane   Much cuing to increase stride length - working w/o st cane      TREATMENT:   EXERCISES:   [x]   HIP ADD / BUTTERFLY STRETCHES  2 min  [x]   SUPINE HIP FLEXOR STRETCHES off SIDE of BED  2 min ea  [x]   SIT to STAND x 10 std chair (18 inches)   [x]   SIDE STEPPING 30ft ea  [x]   MONSTER WALK Fwd/Retro 40 ft ea  w/ SBA  [x]   RAFFAELE LEG PRESS 175# 2x 20 (w/ bars lifted)  [x]   RAFFAELE HIP ABDuction 85# 2x 15   [x]   LATERAL STEP UP w/ ABD; (hole 3 from bottom) alternating legs x 15 ea  [x]   UP/DOWN 2 FLIGHTS of STAIRS w/ HR and st cane Reciprocally - w/ SBA  [x]   SEATED HS CURL (seat 7) 30# x 20  [x]   AMBULATE TRACK x 2 laps total w/ emphasis on longer stride length and arm swing w/o AD  [x]   NUSTEP Seat 11 Arms 11 Level 9 x 10 min  []   FABRICATED HEP and REVIEWED w/ PATIENT      NMR: Verbal and tactile cues to facilitate core, posture,  quads/VMO, glutes, hip / LE musculature statically and dynamically. - Balance / proprioception activities -  - ;cuing for longer stride length in gait -      Functional / Therapeutic Activities:    TAPING / BRACING: K-tape to Unload PF jt (B) knees (2 layers )  SEE EXERCISEs -        Assessment/Plan  80 yo male; L>R KNEE PAIN; (L) KNEE OA, PF jt pain/OA; (R) KNEE h/o TKA ; Multiple (R) knee surgeries -   Pt would benefit from further assessment of (L) knee to assess state of OA, rule out possible MMT -      Taping greatly reduced (B) knee pain, allowing improved gait, function, tolerance to PT activities       Pt able to ambulate up/down stairs reciprocally w/ HR and cane w/o pain with taping -    Progress strengthening /stabilization /functional activity       _________________________________________________    Manual Therapy:                 mins  54880;  Therapeutic Exercise:    20    mins  14424;     Neuromuscular Devonte:   05     mins  89868;    Therapeutic Activity:     20      mins  30883;     Ultrasound:                          mins  93851;  Iontophoresis:                     mins  90768;    Electrical Stimulation:         mins  07917 ( );  Mechanical Traction:          mins  78971  Dry Needling                       mins self-pay     Timed Treatment:   45    mins                  Total Treatment:     45    mins           Robbi Szymanski, PT  Physical Therapist  Lic # [id number]

## 2025-02-03 ENCOUNTER — TREATMENT (OUTPATIENT)
Dept: PHYSICAL THERAPY | Facility: CLINIC | Age: 80
End: 2025-02-03
Payer: MEDICARE

## 2025-02-03 DIAGNOSIS — M25.562 CHRONIC PAIN OF BOTH KNEES: ICD-10-CM

## 2025-02-03 DIAGNOSIS — M17.11 PRIMARY OSTEOARTHRITIS OF RIGHT KNEE: ICD-10-CM

## 2025-02-03 DIAGNOSIS — M54.50 CHRONIC BILATERAL LOW BACK PAIN, UNSPECIFIED WHETHER SCIATICA PRESENT: ICD-10-CM

## 2025-02-03 DIAGNOSIS — M25.561 CHRONIC PAIN OF BOTH KNEES: ICD-10-CM

## 2025-02-03 DIAGNOSIS — G89.29 CHRONIC PAIN OF BOTH KNEES: ICD-10-CM

## 2025-02-03 DIAGNOSIS — Z74.09 IMPAIRED FUNCTIONAL MOBILITY, BALANCE, GAIT, AND ENDURANCE: ICD-10-CM

## 2025-02-03 DIAGNOSIS — M17.12 PRIMARY OSTEOARTHRITIS OF LEFT KNEE: Primary | ICD-10-CM

## 2025-02-03 DIAGNOSIS — G89.29 CHRONIC BILATERAL LOW BACK PAIN, UNSPECIFIED WHETHER SCIATICA PRESENT: ICD-10-CM

## 2025-02-03 PROCEDURE — 97110 THERAPEUTIC EXERCISES: CPT | Performed by: PHYSICAL THERAPIST

## 2025-02-03 PROCEDURE — 97530 THERAPEUTIC ACTIVITIES: CPT | Performed by: PHYSICAL THERAPIST

## 2025-02-03 PROCEDURE — 97112 NEUROMUSCULAR REEDUCATION: CPT | Performed by: PHYSICAL THERAPIST

## 2025-02-03 NOTE — PROGRESS NOTES
Physical Therapy Daily Note    Patient Name: Isauro Sevilla III         :  1945  Referring Physician: Bryan Mcmullen MD  Treatment Date: 2/3/2025  Date of Initial Visit: No linked episodes    Visit Diagnoses:    ICD-10-CM ICD-9-CM   1. Primary osteoarthritis of left knee  M17.12 715.16   2. Impaired functional mobility, balance, gait, and endurance  Z74.09 V49.89   3. Chronic pain of both knees  M25.561 719.46    M25.562 338.29    G89.29    4. Primary osteoarthritis of right knee  M17.11 715.16   5. Chronic bilateral low back pain, unspecified whether sciatica present  M54.50 724.2    G89.29 338.29     Patient seen for Visit count could not be calculated. Make sure you are using a visit which is associated with an episode. sessions  _____________________________________________________    Subjective   Isauro Sevilla III reports: worked out in pool yesterday and is sore and fatigues as a result - c/o extreme fatigue during PT - admits to only eating a banana before PT -    Objective   Pt ambulating w/ st cane w/ shortended stride length / guarded gait -   Required frequent breaks due to fatigue -   Shortened treatment due to Pt fatigue - Advised to go rest and eat something - advised Pt eat adequate meal prior to PT w/ emphasis on protein and reduced carbs -     TREATMENT:   EXERCISES:   []   HIP ADD / BUTTERFLY STRETCHES  2 min  []   SUPINE HIP FLEXOR STRETCHES off SIDE of BED  2 min ea  [x]   SIT to STAND x 10 std chair (18 inches)   [x]   SIDE STEPPING 30ft ea  [x]   MONSTER WALK Fwd/Retro 30 ft ea  w/ SBA  [x]   RAFFAELE LEG PRESS 200#  10, 15, 15 (SEAT BACK MAX w/ bars lifted)  [x]   RAFFAELE HIP ABDuction 85# 2x 15   []   LATERAL STEP UP w/ ABD; (hole 3 from bottom) alternating legs x 15 ea  []   UP/DOWN 2 FLIGHTS of STAIRS w/ HR and st cane Reciprocally - w/ SBA  []   SEATED HS CURL (seat 7) 30# x 20  []   AMBULATE TRACK x 2 laps total w/ emphasis on longer stride length and arm swing w/o AD  []   NUSTEP  Seat 11 Arms 11 Level 9 x 10 min  []   FABRICATED HEP and REVIEWED w/ PATIENT      NMR: Verbal and tactile cues to facilitate core, posture,  quads/VMO, glutes, hip / LE musculature statically and dynamically. - Balance / proprioception activities -  - ;cuing for longer stride length in gait -     Functional / Therapeutic Activities:    TAPING / BRACING: K-tape to Unload PF jt (B) knees (2 layers )  SEE EXERCISEs -        Assessment/Plan  80 yo male; L>R KNEE PAIN; (L) KNEE OA, PF jt pain/OA; (R) KNEE h/o TKA ; Multiple (R) knee surgeries -   Pt would benefit from further assessment of (L) knee to assess state of OA, rule out possible MMT -      Taping greatly reduced (B) knee pain, allowing improved gait, function, tolerance to PT activities       Pt able to ambulate up/down stairs reciprocally w/ HR and cane w/o pain with taping -    Progress strengthening /stabilization /functional activity - FULL PROGRAM AS TOLERATED       _________________________________________________    Manual Therapy:                 mins  01100;  Therapeutic Exercise:    10   mins  00888;     Neuromuscular Devonte:   05     mins  70372;    Therapeutic Activity:     18      mins  42770;     Ultrasound:                          mins  18644;  Iontophoresis:                     mins  96256;    Electrical Stimulation:         mins  65193 ( );  Mechanical Traction:          mins  76379  Dry Needling                       mins self-pay     Timed Treatment:   38    mins                  Total Treatment:     38    mins        Robbi Szymanski PT  Physical Therapist  Lic # [id number]

## 2025-02-05 ENCOUNTER — TREATMENT (OUTPATIENT)
Dept: PHYSICAL THERAPY | Facility: CLINIC | Age: 80
End: 2025-02-05
Payer: MEDICARE

## 2025-02-05 DIAGNOSIS — M17.11 PRIMARY OSTEOARTHRITIS OF RIGHT KNEE: ICD-10-CM

## 2025-02-05 DIAGNOSIS — Z74.09 IMPAIRED FUNCTIONAL MOBILITY, BALANCE, GAIT, AND ENDURANCE: ICD-10-CM

## 2025-02-05 DIAGNOSIS — G89.29 CHRONIC PAIN OF BOTH KNEES: ICD-10-CM

## 2025-02-05 DIAGNOSIS — M25.561 CHRONIC PAIN OF BOTH KNEES: ICD-10-CM

## 2025-02-05 DIAGNOSIS — M25.562 CHRONIC PAIN OF BOTH KNEES: ICD-10-CM

## 2025-02-05 DIAGNOSIS — M17.12 PRIMARY OSTEOARTHRITIS OF LEFT KNEE: Primary | ICD-10-CM

## 2025-02-05 NOTE — PROGRESS NOTES
Physical Therapy Daily Note    Patient Name: Isauro Sevilla III         :  1945  Referring Physician: Bryan Mcmullen MD  Treatment Date: 2025  Date of Initial Visit: Type: THERAPY  Noted: 2024    Visit Diagnoses:    ICD-10-CM ICD-9-CM   1. Primary osteoarthritis of left knee  M17.12 715.16   2. Impaired functional mobility, balance, gait, and endurance  Z74.09 V49.89   3. Chronic pain of both knees  M25.561 719.46    M25.562 338.29    G89.29    4. Primary osteoarthritis of right knee  M17.11 715.16     Patient seen for 26 sessions  _____________________________________________________    Subjective   Isauro Sevilla III reports: did the pool 3 times this week - feel better - ate eggs for breakfast -     Objective   Pt does better going down stairs reciprocally w/ his cane and HR - very well going up w/o cane and minimal use of hand rail -     TREATMENT:   EXERCISES:   []   HIP ADD / BUTTERFLY STRETCHES  2 min  []   SUPINE HIP FLEXOR STRETCHES off SIDE of BED  2 min ea  [x]   SIT to STAND x 10 std chair (18 inches)   [x]   SIDE STEPPING 30ft ea  [x]   MONSTER WALK Fwd/Retro 30 ft ea  w/ SBA  [x]   RAFFAELE LEG PRESS 200#  10, 15, 15 (SEAT BACK MAX w/ bars lifted)  [x]   RAFFAELE HIP ABDuction 85# 2x 15   [x]   LATERAL STEP UP w/ ABD; (hole 3 from bottom) alternating legs x 15 ea  [x]   UP/DOWN 2 FLIGHTS of STAIRS w/ HR and st cane Reciprocally - w/ SBA  [x]   SEATED HS CURL (seat 7) 30# x 20  [x]   AMBULATE TRACK x 2 laps total w/ emphasis on longer stride length and arm swing w/o AD  [x]   NUSTEP Seat 11 Arms 11 Level 9 x 10 min  []   UPATED HEP and REVIEWED w/ PATIENT      NMR: Verbal and tactile cues to facilitate core, posture,  quads/VMO, glutes, hip / LE musculature statically and dynamically. - Balance / proprioception activities -  - ;cuing for longer stride length in gait -     Functional / Therapeutic Activities:    TAPING / BRACING: K-tape to Unload PF jt (B) knees w/ spray adhesive prior  SEE  EXERCISEs -         Assessment/Plan  78 yo male; L>R KNEE PAIN; (L) KNEE OA, PF jt pain/OA; (R) KNEE h/o TKA ; Multiple (R) knee surgeries -   Pt would benefit from further assessment of (L) knee to assess state of OA, rule out possible MMT -      Taping greatly reduced (B) knee pain, allowing improved gait, function, tolerance to PT activities       Pt able to ambulate up/down stairs reciprocally w/ HR and cane w/o pain with taping -     Progress strengthening /stabilization /functional activity       _________________________________________________    Manual Therapy:                 mins  00305;  Therapeutic Exercise:    20    mins  66126;     Neuromuscular Devonte:   05     mins  78458;    Therapeutic Activity:     20      mins  19431;     Ultrasound:                          mins  23647;  Iontophoresis:                     mins  36521;    Electrical Stimulation:         mins  98783 ( );  Mechanical Traction:          mins  32415  Dry Needling                       mins self-pay     Timed Treatment:   45    mins                  Total Treatment:     45    mins        Robbi Szymanski PT  Physical Therapist  Lic # [id number]

## 2025-02-17 ENCOUNTER — TREATMENT (OUTPATIENT)
Dept: PHYSICAL THERAPY | Facility: CLINIC | Age: 80
End: 2025-02-17
Payer: MEDICARE

## 2025-02-17 DIAGNOSIS — Z74.09 IMPAIRED FUNCTIONAL MOBILITY, BALANCE, GAIT, AND ENDURANCE: ICD-10-CM

## 2025-02-17 DIAGNOSIS — M25.562 CHRONIC PAIN OF BOTH KNEES: ICD-10-CM

## 2025-02-17 DIAGNOSIS — M17.12 PRIMARY OSTEOARTHRITIS OF LEFT KNEE: Primary | ICD-10-CM

## 2025-02-17 DIAGNOSIS — M54.50 CHRONIC BILATERAL LOW BACK PAIN, UNSPECIFIED WHETHER SCIATICA PRESENT: ICD-10-CM

## 2025-02-17 DIAGNOSIS — G89.29 CHRONIC PAIN OF BOTH KNEES: ICD-10-CM

## 2025-02-17 DIAGNOSIS — G89.29 CHRONIC BILATERAL LOW BACK PAIN, UNSPECIFIED WHETHER SCIATICA PRESENT: ICD-10-CM

## 2025-02-17 DIAGNOSIS — M25.561 CHRONIC PAIN OF BOTH KNEES: ICD-10-CM

## 2025-02-17 DIAGNOSIS — M17.11 PRIMARY OSTEOARTHRITIS OF RIGHT KNEE: ICD-10-CM

## 2025-02-17 PROCEDURE — 97112 NEUROMUSCULAR REEDUCATION: CPT | Performed by: PHYSICAL THERAPIST

## 2025-02-17 PROCEDURE — 97110 THERAPEUTIC EXERCISES: CPT | Performed by: PHYSICAL THERAPIST

## 2025-02-17 PROCEDURE — 97530 THERAPEUTIC ACTIVITIES: CPT | Performed by: PHYSICAL THERAPIST

## 2025-02-17 NOTE — PROGRESS NOTES
Physical Therapy Daily Note  RE-ASSESSMENT    Patient Name: Isauro Sevilla III         :  1945  Referring Physician: Bryan Mcmullen MD  Treatment Date: 2025  Date of Initial Visit: Type: THERAPY  Noted: 2024    Visit Diagnoses:    ICD-10-CM ICD-9-CM   1. Primary osteoarthritis of left knee  M17.12 715.16   2. Impaired functional mobility, balance, gait, and endurance  Z74.09 V49.89   3. Chronic pain of both knees  M25.561 719.46    M25.562 338.29    G89.29    4. Primary osteoarthritis of right knee  M17.11 715.16   5. Chronic bilateral low back pain, unspecified whether sciatica present  M54.50 724.2    G89.29 338.29     Patient seen for 27 sessions  _____________________________________________________    Subjective   Isauro Sevilla III reports: falling last week and cracking a rib - after an ablasion - fell on a step at home - hitting his wrist, arm -   Knees doing better - tape very helpful     Objective   Pt ambulating w/ st cane -smaller strides - cuing to increase stride and Pt guarded -   STRENGTH: Able to get up out of std chair w/o UE assist -   AROM: WFL (B) KNEES       TREATMENT:   EXERCISES:   []   HIP ADD / BUTTERFLY STRETCHES  2 min  []   SUPINE HIP FLEXOR STRETCHES off SIDE of BED  2 min ea  [x]   SIT to STAND x 10 std chair (18 inches)   []   SIDE STEPPING 30ft ea  []   MONSTER WALK Fwd/Retro 30 ft ea  w/ SBA  [x]   RAFFAELE LEG PRESS 200#  10, 15, 15 (SEAT BACK MAX w/ bars lifted)  [x]   RAFFAELE HIP ABDuction 85# 2x 15   [x]   LATERAL STEP UP w/ ABD; (hole 3 from bottom) alternating legs x 15 ea  [x]   UP/DOWN 2 FLIGHTS of STAIRS w/ HR and st cane Reciprocally - w/ SBA  [x]   SEATED HS CURL (seat 7) 30# x 20  [x]   AMBULATE TRACK x 2 laps total w/ emphasis on longer stride length and arm swing w/o AD  [x]   NUSTEP Seat 11 Arms 11 Level 9 x 10 min  []   UPATED HEP and REVIEWED w/ PATIENT      NMR: Verbal and tactile cues to facilitate core, posture,  quads/VMO, glutes, hip / LE  musculature statically and dynamically. - Balance / proprioception activities -  - ;cuing for longer stride length in gait -     Functional / Therapeutic Activities:    TAPING / BRACING: K-tape to Unload PF jt (B) knees (2 layers (L) knee)  SEE EXERCISEs -   FUNCTIONAL ASSESSMENT     5x SIT TO STAND: 19 sec (slower vs last assessment, but may be due to having a cracked rib)  no UE assist  TUG TEST: 13 sec w/ cane       Assessment/Plan  78 yo male; L>R KNEE PAIN; (L) KNEE OA, PF jt pain/OA; (R) KNEE h/o TKA ; Multiple (R) knee surgeries -   Pt would benefit from further assessment of (L) knee to assess state of OA, rule out possible MMT -      Taping greatly reduced (B) knee pain, allowing improved gait, function, tolerance to PT activities     More fatigued today - due to not sleeping due to rib pain (R) -      GOAL STATUS:   Goals  Plan Goals: STGs:  1. Patient will be independent with education for symptom management, joint protection and strategies to minimize stress on affected tissues. MET  2. Pt is able to tolerate 30 min of aquatic therapy with reports of reduced pain levels after treatment for a couple of hours.   MET  3. Pt to improve 5xSTS from 18.8 sec to 16 sec. -MET on 12-6-24,   4. Pt to improve TUG from 17.8 sec to 16 sec.  ONGOING     LTGS:  1. Pt to improve his time on the 5X Sit to Stand test from 16 sec to 14 sec for greater ease getting in/out of the car. ONGOING, 19 sec w/ minimal use of UEs  2. Pt to improve trunk and LE strength by 1/2 to 1 MMT grade for ease of getting in/out of the therapy pool to continue with his workouts following PHYSICAL THERAPY.  MET  3. Pt to improve TUG from 16 sec to 14 sec for improved balance and mobility.  with cane - MET- 13sec w/ cane  4. Pt to be independent with progressive HEP for core and LE strength. -LAND - ONGOING  5. Patient to be independent with aquatic exercises for balance. MET  6) Able to ambulate up/down 2 flights of stairs reciprocally w/ HR and  knees w/wo tape - ABLE w/ CANE as well    Isauro would benefit from continued Physical therapy for safe and full return to ADLs and hobby activities w/ wo AD and modifications prn -     Progress strengthening /stabilization /functional activity       _________________________________________________    Manual Therapy:                 mins  13815;  Therapeutic Exercise:    20    mins  81379;     Neuromuscular Devonte:   05     mins  66904;    Therapeutic Activity:     20      mins  18845;     Ultrasound:                          mins  80251;  Iontophoresis:                     mins  36347;    Electrical Stimulation:         mins  58271 ( );  Mechanical Traction:          mins  76494  Dry Needling                       mins self-pay     Timed Treatment:   45    mins                  Total Treatment:     45    mins        Robbi Szymanski PT  Physical Therapist  Lic # [id number]

## 2025-03-04 ENCOUNTER — TREATMENT (OUTPATIENT)
Dept: PHYSICAL THERAPY | Facility: CLINIC | Age: 80
End: 2025-03-04
Payer: MEDICARE

## 2025-03-04 NOTE — PROGRESS NOTES
Physical Therapy Daily Note    Patient Name: Isauro Sevilla III         :  1945  Referring Physician: Bryan Mcmullen MD  Treatment Date: 3/4/2025  Date of Initial Visit: Type: THERAPY  Noted: 2024    Visit Diagnoses:  No diagnosis found.  Patient seen for 28 sessions  _____________________________________________________    Subjective   Isauro Sevilla III reports: ribs still sore from fall - has been sick - not reached out to  yet -    Objective   Pt ambulates w/ st cane w/ reduced stride length and minimal arm swing (R) - and leaning fwd -apprehensive at times - reaching for wall / or PT at times when walking     TREATMENT:   EXERCISES:   []   HIP ADD / BUTTERFLY STRETCHES  2 min  []   SUPINE HIP FLEXOR STRETCHES off SIDE of BED  2 min ea  [x]   SIT to STAND 2x 5 std chair (18 inches)   [x]   SIDE STEPPING 30ft ea  (SBA)  [x]   MONSTER WALK Fwd/Retro 30 ft ea  w/ SBA  [x]   RAFFAELE LEG PRESS 200#  10, 15, 15 (SEAT BACK MAX w/ bars lifted)  [x]   RAFFAELE HIP ABDuction 85# 2x 15   [x]   LATERAL STEP UP w/ ABD; (hole 3 from bottom) alternating legs x 15 ea  [x]   UP/DOWN 2 FLIGHTS of STAIRS w/ HR and st cane Reciprocally - w/ SBA  []   SEATED HS CURL (seat 7) 30# x 20  [x]   SLS (requires much UE support)  R / L  []   TANDEM STANDING R/L; L/R  [x]   AMBULATE TRACK x 2 laps total w/ emphasis on longer stride length and arm swing w/o AD  [x]   NUSTEP Seat 11 Arms 11 Level 9 x 10 min  []   UPATED HEP and REVIEWED w/ PATIENT      NMR: Verbal and tactile cues to facilitate core, posture,  quads/VMO, glutes, hip / LE musculature statically and dynamically. - Balance / proprioception activities -  - ;cuing for longer stride length in gait -     Functional / Therapeutic Activities:    TAPING / BRACING: K-tape to Unload PF jt (B) knees (2 layers (L) knee)  SEE EXERCISEs -  Discussed starting to work with  and weaning off PT -   Discussed trying a ROLLATOR to allow larger strides, more  confidence and improved functional ability w/ reduced fall risk       Assessment/Plan  78 yo male; L>R KNEE PAIN; (L) KNEE OA, PF jt pain/OA; (R) KNEE h/o TKA ; Multiple (R) knee surgeries -   Pt would benefit from further assessment of (L) knee to assess state of OA, rule out possible MMT -   Taping greatly reduced (B) knee pain, allowing improved gait, function, tolerance to PT activities    Pt would benefit from more consistent HEP / gym attendance to work when not in PT - need to progress to  at [employer]     Pt may benefit from ROLLATOR to allow larger strides, more confidence and improved functional ability w/ reduced fall risk    Progress strengthening /stabilization /functional activity - Pt to reach out to [place] -        _________________________________________________    Manual Therapy:                 mins  17549;  Therapeutic Exercise:    20    mins  89465;     Neuromuscular Devonte:   08     mins  44046;    Therapeutic Activity:     15      mins  00089;     Ultrasound:                          mins  93728;  Iontophoresis:                     mins  61566;    Electrical Stimulation:         mins  53299 ( );  Mechanical Traction:          mins  42951  Dry Needling                       mins self-pay     Timed Treatment:   43    mins                  Total Treatment:     43    mins        Robbi Szymanski, PT  Physical Therapist  Lic # [id number]

## 2025-03-11 ENCOUNTER — TELEPHONE (OUTPATIENT)
Dept: PHYSICAL THERAPY | Facility: CLINIC | Age: 80
End: 2025-03-11

## 2025-03-11 NOTE — TELEPHONE ENCOUNTER
Caller: Isauro Sevilla III    Relationship: Self      What was the call regarding: IN PAIN FROM A PROCEDURE DONE YESTERDAY

## 2025-03-18 ENCOUNTER — TREATMENT (OUTPATIENT)
Dept: PHYSICAL THERAPY | Facility: CLINIC | Age: 80
End: 2025-03-18
Payer: MEDICARE

## 2025-03-18 DIAGNOSIS — M25.562 CHRONIC PAIN OF BOTH KNEES: ICD-10-CM

## 2025-03-18 DIAGNOSIS — M17.11 PRIMARY OSTEOARTHRITIS OF RIGHT KNEE: ICD-10-CM

## 2025-03-18 DIAGNOSIS — M25.561 CHRONIC PAIN OF BOTH KNEES: ICD-10-CM

## 2025-03-18 DIAGNOSIS — G89.29 CHRONIC PAIN OF BOTH KNEES: ICD-10-CM

## 2025-03-18 DIAGNOSIS — M17.12 PRIMARY OSTEOARTHRITIS OF LEFT KNEE: Primary | ICD-10-CM

## 2025-03-18 DIAGNOSIS — M54.50 CHRONIC BILATERAL LOW BACK PAIN, UNSPECIFIED WHETHER SCIATICA PRESENT: ICD-10-CM

## 2025-03-18 DIAGNOSIS — G89.29 CHRONIC BILATERAL LOW BACK PAIN, UNSPECIFIED WHETHER SCIATICA PRESENT: ICD-10-CM

## 2025-03-18 DIAGNOSIS — Z74.09 IMPAIRED FUNCTIONAL MOBILITY, BALANCE, GAIT, AND ENDURANCE: ICD-10-CM

## 2025-03-18 NOTE — PROGRESS NOTES
Physical Therapy Daily Note    Patient Name: Isauro Sevilla III         :  1945  Referring Physician: Bryan Mcmullen MD  Treatment Date: 3/18/2025  Date of Initial Visit: Type: THERAPY  Noted: 2024    Visit Diagnoses:    ICD-10-CM ICD-9-CM   1. Primary osteoarthritis of left knee  M17.12 715.16   2. Impaired functional mobility, balance, gait, and endurance  Z74.09 V49.89   3. Chronic pain of both knees  M25.561 719.46    M25.562 338.29    G89.29    4. Primary osteoarthritis of right knee  M17.11 715.16   5. Chronic bilateral low back pain, unspecified whether sciatica present  M54.50 724.2    G89.29 338.29     Patient seen for 29 sessions  _____________________________________________________    Subjective   Isauro Sevilla III reports: missed PT due to flare up of old ankle issues - feels like a spike in ankle and grinding pain - Mostly when not wearing shoes in house -     Objective   Pt ambulating w/ st cane - small strides and reaching for items / wall-    TREATMENT:   EXERCISES:   [x]   NUSTEP Seat 11 Arms 11 Level 9 x 10 min  []   HIP ADD / BUTTERFLY STRETCHES  2 min  []   SUPINE HIP FLEXOR STRETCHES off SIDE of BED  2 min ea  [x]   SIT to STAND 2x 5 std chair (18 inches)   [x]   SIDE STEPPING 30ft ea  (SBA)  [x]   MONSTER WALK Fwd/Retro 30 ft ea  w/ SBA  [x]   RAFFAELE LEG PRESS 200#  10, 15, 15 (SEAT BACK MAX w/ bars lifted)  [x]   RAFFAELE HIP ABDuction 90# 2x 15   [x]   LATERAL STEP UP w/ ABD; (hole 3 from bottom) alternating legs x 15 ea  [x]   UP/DOWN 2 FLIGHTS of STAIRS w/ HR and st cane Reciprocally - w/ SBA  []   SEATED HS CURL (seat 7) 30# x 20  [x]   SLS (requires much UE support)  R / L  []   TANDEM STANDING R/L; L/R  [x]   AMBULATE TRACK x 1 laps total w/ emphasis on longer stride length and arm swing w/o AD    []   UPATED HEP and REVIEWED w/ PATIENT      NMR: Verbal and tactile cues to facilitate core, posture,  quads/VMO, glutes, hip / LE musculature statically and dynamically. - Balance  / proprioception activities -  - ;cuing for longer stride length in gait -     Functional / Therapeutic Activities:    TAPING / BRACING: K-tape to Unload PF jt (B) knees (2 layers (L) knee)  SEE EXERCISEs -        Assessment/Plan  78 yo male; L>R KNEE PAIN; (L) KNEE OA, PF jt pain/OA; (R) KNEE h/o TKA ; Multiple (R) knee surgeries -   Pt would benefit from further assessment of (L) knee to assess state of OA, rule out possible MMT -   Taping greatly reduced (B) knee pain, allowing improved gait, function, tolerance to PT activities     Pt would benefit from more consistent HEP / gym attendance to work when not in PT - need to progress to  at [place]      Pt may benefit from ROLLATOR to allow larger strides, more confidence and improved functional ability w/ reduced fall risk    Progress strengthening /stabilization /functional activity       _________________________________________________    Manual Therapy:                 mins  09173;  Therapeutic Exercise:    20    mins  35707;     Neuromuscular Devonte:   08     mins  10386;    Therapeutic Activity:     17      mins  61566;     Ultrasound:                          mins  61090;  Iontophoresis:                     mins  53549;    Electrical Stimulation:         mins  98667 ( );  Mechanical Traction:          mins  42011  Dry Needling                       mins self-pay     Timed Treatment:   45    mins                  Total Treatment:     45    mins        Robbi Szymanski, PT  Physical Therapist  Lic # [id number]

## 2025-03-19 ENCOUNTER — TREATMENT (OUTPATIENT)
Dept: PHYSICAL THERAPY | Facility: CLINIC | Age: 80
End: 2025-03-19
Payer: MEDICARE

## 2025-03-19 DIAGNOSIS — M17.12 PRIMARY OSTEOARTHRITIS OF LEFT KNEE: Primary | ICD-10-CM

## 2025-03-19 DIAGNOSIS — M17.11 PRIMARY OSTEOARTHRITIS OF RIGHT KNEE: ICD-10-CM

## 2025-03-19 DIAGNOSIS — G89.29 CHRONIC BILATERAL LOW BACK PAIN, UNSPECIFIED WHETHER SCIATICA PRESENT: ICD-10-CM

## 2025-03-19 DIAGNOSIS — G89.29 CHRONIC PAIN OF BOTH KNEES: ICD-10-CM

## 2025-03-19 DIAGNOSIS — M54.50 CHRONIC BILATERAL LOW BACK PAIN, UNSPECIFIED WHETHER SCIATICA PRESENT: ICD-10-CM

## 2025-03-19 DIAGNOSIS — M25.562 CHRONIC PAIN OF BOTH KNEES: ICD-10-CM

## 2025-03-19 DIAGNOSIS — Z74.09 IMPAIRED FUNCTIONAL MOBILITY, BALANCE, GAIT, AND ENDURANCE: ICD-10-CM

## 2025-03-19 DIAGNOSIS — M25.561 CHRONIC PAIN OF BOTH KNEES: ICD-10-CM

## 2025-03-19 NOTE — PROGRESS NOTES
Physical Therapy Daily Note    Patient Name: Isauro Sevilla III         :  1945  Referring Physician: Bryan Mcmullen MD  Treatment Date: 3/19/2025  Date of Initial Visit: Type: THERAPY  Noted: 2024    Visit Diagnoses:    ICD-10-CM ICD-9-CM   1. Primary osteoarthritis of left knee  M17.12 715.16   2. Impaired functional mobility, balance, gait, and endurance  Z74.09 V49.89   3. Primary osteoarthritis of right knee  M17.11 715.16   4. Chronic bilateral low back pain, unspecified whether sciatica present  M54.50 724.2    G89.29 338.29   5. Chronic pain of both knees  M25.561 719.46    M25.562 338.29    G89.29      Patient seen for 30 sessions  _____________________________________________________    Subjective   Isauro Sevilla III reports: - No new problems -     Objective   Pt ambulating w/ st cane - much cuing to take larger strides - at times reaching for wall, etc for balance w/ UE guarding -     TREATMENT:   EXERCISES:   [x]   NUSTEP Seat 11 Arms 11 Level 9 x 10 min  []   HIP ADD / BUTTERFLY STRETCHES  2 min  []   SUPINE HIP FLEXOR STRETCHES off SIDE of BED  2 min ea  [x]   SIT to STAND 2x 5 std chair (18 inches)   [x]   SIDE STEPPING 30ft ea  (SBA)  [x]   MONSTER WALK Fwd/Retro 30 ft ea  w/ SBA  [x]   RAFFAELE LEG PRESS 200#  10, 15, 15 (SEAT BACK MAX w/ bars lifted)  [x]   RAFFAELE HIP ABDuction 90# 2x 15   [x]   LATERAL STEP UP w/ ABD; (hole 3 from bottom) alternating legs x 15 ea  [x]   UP/DOWN 2 FLIGHTS of STAIRS w/ HR and st cane Reciprocally - w/ SBA  []   SEATED HS CURL (seat 7) 30# x 20  [x]   SLS (requires much UE support)  R / L  []   TANDEM STANDING R/L; L/R  [x]   AMBULATE TRACK x 1 laps total w/ emphasis on longer stride length and arm swing w/o AD     []   UPATED HEP and REVIEWED w/ PATIENT      NMR: Verbal and tactile cues to facilitate core, posture,  quads/VMO, glutes, hip / LE musculature statically and dynamically. - Balance / proprioception activities -  - ;cuing for longer stride  length in gait -     Functional / Therapeutic Activities:    TAPING / BRACING: K-tape to Unload PF jt (B) knees (2 layers (L) knee)  SEE EXERCISEs -       Assessment/Plan  80 yo male; L>R KNEE PAIN; (L) KNEE OA, PF jt pain/OA; (R) KNEE h/o TKA ; Multiple (R) knee surgeries -   Pt would benefit from further assessment of (L) knee to assess state of OA, rule out possible MMT -   Taping greatly reduced (B) knee pain, allowing improved gait, function, tolerance to PT activities     Pt would benefit from more consistent HEP / gym attendance to work when not in PT - need to progress to  at [place]      Pt may benefit from ROLLATOR to allow larger strides, more confidence and improved functional ability w/ reduced fall risk    Progress strengthening /stabilization /functional activity       _________________________________________________    Manual Therapy:                 mins  21093;  Therapeutic Exercise:    17    mins  20565;     Neuromuscular Devonte:   05     mins  10602;    Therapeutic Activity:     20      mins  13236;     Ultrasound:                          mins  40123;  Iontophoresis:                     mins  04083;    Electrical Stimulation:         mins  21347 ( );  Mechanical Traction:          mins  47251  Dry Needling                       mins self-pay     Timed Treatment:   42    mins                  Total Treatment:     42    mins        Robbi Szymanski, PT  Physical Therapist  Lic # [id number]

## 2025-03-25 ENCOUNTER — TREATMENT (OUTPATIENT)
Dept: PHYSICAL THERAPY | Facility: CLINIC | Age: 80
End: 2025-03-25
Payer: MEDICARE

## 2025-03-25 DIAGNOSIS — G89.29 CHRONIC BILATERAL LOW BACK PAIN, UNSPECIFIED WHETHER SCIATICA PRESENT: ICD-10-CM

## 2025-03-25 DIAGNOSIS — M25.562 CHRONIC PAIN OF BOTH KNEES: ICD-10-CM

## 2025-03-25 DIAGNOSIS — G89.29 CHRONIC PAIN OF BOTH KNEES: ICD-10-CM

## 2025-03-25 DIAGNOSIS — M25.561 CHRONIC PAIN OF BOTH KNEES: ICD-10-CM

## 2025-03-25 DIAGNOSIS — M17.12 PRIMARY OSTEOARTHRITIS OF LEFT KNEE: Primary | ICD-10-CM

## 2025-03-25 DIAGNOSIS — M17.11 PRIMARY OSTEOARTHRITIS OF RIGHT KNEE: ICD-10-CM

## 2025-03-25 DIAGNOSIS — M54.50 CHRONIC BILATERAL LOW BACK PAIN, UNSPECIFIED WHETHER SCIATICA PRESENT: ICD-10-CM

## 2025-03-25 DIAGNOSIS — Z74.09 IMPAIRED FUNCTIONAL MOBILITY, BALANCE, GAIT, AND ENDURANCE: ICD-10-CM

## 2025-03-25 NOTE — PROGRESS NOTES
Physical Therapy Daily Note    Patient Name: Isauro Sevilla III         :  1945  Referring Physician: Bryan Mcmullen MD  Treatment Date: 3/25/2025  Date of Initial Visit: Type: THERAPY  Noted: 2024    Visit Diagnoses:    ICD-10-CM ICD-9-CM   1. Primary osteoarthritis of left knee  M17.12 715.16   2. Impaired functional mobility, balance, gait, and endurance  Z74.09 V49.89   3. Primary osteoarthritis of right knee  M17.11 715.16   4. Chronic bilateral low back pain, unspecified whether sciatica present  M54.50 724.2    G89.29 338.29   5. Chronic pain of both knees  M25.561 719.46    M25.562 338.29    G89.29      Patient seen for 31 sessions  _____________________________________________________    Subjective   Isauro Sevilla III reports: feeling like he is walking better     Objective   Pt ambulating w/ st cane w/ longer strides and RUE in guarded posture / limited arm swing     TREATMENT:   EXERCISES:   [x]   NUSTEP Seat 11 Arms 11 Level 9 x 10 min  []   HIP ADD / BUTTERFLY STRETCHES  2 min  []   SUPINE HIP FLEXOR STRETCHES off SIDE of BED  2 min ea  [x]   SIT to STAND 2x 5 std chair (18 inches)   [x]   SIDE STEPPING 30ft ea  (SBA)  [x]   MONSTER WALK Fwd/Retro 30 ft ea  w/ SBA  [x]   RAFFAELE LEG PRESS 200#  10, 15, 15 (SEAT BACK MAX w/ bars lifted)  [x]   RAFFAELE HIP ABDuction 90# 2x 15   [x]   LATERAL STEP UP w/ ABD; (hole 3 from bottom) alternating legs x 15 ea  [x]   UP/DOWN 2 FLIGHTS of STAIRS w/ HR and st cane Reciprocally - w/ SBA  []   SEATED HS CURL (seat 7) 30# x 20  [x]   SLS (requires much UE support)  R / L  [x]    BALANCE BOARD Frnt/Bk; Side/Side -  Pt holding on too much - much cuing needed to not hold on w/ UEs - 1-2 min ea  [x]   TANDEM STANDING R/L; L/R  [x]   AMBULATE TRACK x 1 laps total w/ emphasis on longer stride length and arm swing w/o AD     []   UPATED HEP and REVIEWED w/ PATIENT      NMR: Verbal and tactile cues to facilitate core, posture,  quads/VMO, glutes, hip / LE  musculature statically and dynamically. - Balance / proprioception activities -  - ;cuing for longer stride length in gait -     Functional / Therapeutic Activities:    TAPING / BRACING: K-tape to Unload PF jt (B) knees (2 layers (L) knee)  SEE EXERCISEs -        Assessment/Plan  80 yo male; L>R KNEE PAIN; (L) KNEE OA, PF jt pain/OA; (R) KNEE h/o TKA ; Multiple (R) knee surgeries -   Pt would benefit from further assessment of (L) knee to assess state of OA, rule out possible MMT -   Taping greatly reduced (B) knee pain, allowing improved gait, function, tolerance to PT activities     Pt would benefit from more consistent HEP / gym attendance to work when not in PT - need to progress to  at [place]      Pt may benefit from ROLLATOR to allow larger strides, more confidence and improved functional ability w/ reduced fall risk    Progress strengthening /stabilization /functional activity       _________________________________________________    Manual Therapy:                 mins  74008;  Therapeutic Exercise:    17    mins  83143;     Neuromuscular Devonte:  08     mins  56862;    Therapeutic Activity:     20      mins  93814;     Ultrasound:                          mins  57955;  Iontophoresis:                     mins  78174;    Electrical Stimulation:         mins  40040 ( );  Mechanical Traction:          mins  60415  Dry Needling                       mins self-pay     Timed Treatment:   42    mins                  Total Treatment:     42    mins        Robbi Szymanski, PT  Physical Therapist  Lic # [id number]

## 2025-03-27 ENCOUNTER — TREATMENT (OUTPATIENT)
Dept: PHYSICAL THERAPY | Facility: CLINIC | Age: 80
End: 2025-03-27
Payer: MEDICARE

## 2025-03-27 DIAGNOSIS — M17.11 PRIMARY OSTEOARTHRITIS OF RIGHT KNEE: ICD-10-CM

## 2025-03-27 DIAGNOSIS — M17.12 PRIMARY OSTEOARTHRITIS OF LEFT KNEE: Primary | ICD-10-CM

## 2025-03-27 DIAGNOSIS — G89.29 CHRONIC BILATERAL LOW BACK PAIN, UNSPECIFIED WHETHER SCIATICA PRESENT: ICD-10-CM

## 2025-03-27 DIAGNOSIS — Z74.09 IMPAIRED FUNCTIONAL MOBILITY, BALANCE, GAIT, AND ENDURANCE: ICD-10-CM

## 2025-03-27 DIAGNOSIS — M54.50 CHRONIC BILATERAL LOW BACK PAIN, UNSPECIFIED WHETHER SCIATICA PRESENT: ICD-10-CM

## 2025-03-27 NOTE — PROGRESS NOTES
Physical Therapy Daily Note    Patient Name: Isauro Sevilla III         :  1945  Referring Physician: Bryan Mcmullen MD  Treatment Date: 3/27/2025  Date of Initial Visit: Type: THERAPY  Noted: 2024    Visit Diagnoses:    ICD-10-CM ICD-9-CM   1. Primary osteoarthritis of left knee  M17.12 715.16   2. Impaired functional mobility, balance, gait, and endurance  Z74.09 V49.89   3. Primary osteoarthritis of right knee  M17.11 715.16   4. Chronic bilateral low back pain, unspecified whether sciatica present  M54.50 724.2    G89.29 338.29     Patient seen for 32 sessions  _____________________________________________________    Subjective   Isauro Sevilla III reports: feeling stronger - walking better -     Objective   Pt ambulating w/ straight cane w/ improved stride length initially, but shortens and more UE guarding the longer he walks -    TREATMENT:   EXERCISES:   [x]   NUSTEP Seat 11 Arms 11 Level 9 x 10 min  []   HIP ADD / BUTTERFLY STRETCHES  2 min  []   SUPINE HIP FLEXOR STRETCHES off SIDE of BED  2 min ea  [x]   SIT to STAND 2x 5 std chair (18 inches)   [x]   SIDE STEPPING 30ft ea  (SBA)  [x]   MONSTER WALK Fwd/Retro 30 ft ea  w/ SBA  [x]   RAFFAELE LEG PRESS 200#  10, 15, 15 (SEAT BACK MAX w/ bars lifted)  [x]   RAFFAELE HIP ABDuction 90# 2x 15   [x]   LATERAL STEP UP w/ ABD; (hole 3 from bottom) alternating legs x 15 ea  [x]   UP/DOWN 2 FLIGHTS of STAIRS w/ HR and st cane Reciprocally - w/ SBA  []   SEATED HS CURL (seat 7) 30# x 20  [x]   SLS (requires much UE support)  R / L  [x]    BALANCE BOARD Frnt/Bk; Side/Side -  Pt holding on too much - much cuing needed to not hold on w/ UEs - 1-2 min ea  [x]   TANDEM STANDING R/L; L/R  [x]   AMBULATE TRACK x 1 laps total w/ emphasis on longer stride length and arm swing w/o AD     []   UPATED HEP and REVIEWED w/ PATIENT      NMR: Verbal and tactile cues to facilitate core, posture,  quads/VMO, glutes, hip / LE musculature statically and dynamically. - Balance /  proprioception activities -  - ;cuing for longer stride length in gait -     Functional / Therapeutic Activities:    TAPING / BRACING: K-tape to Unload PF jt (B) knees (2 layers (L) knee)  SEE EXERCISEs -     KOS: 30/80    Assessment/Plan  80 yo male; L>R KNEE PAIN; (L) KNEE OA, PF jt pain/OA; (R) KNEE h/o TKA ; Multiple (R) knee surgeries -   Pt would benefit from further assessment of (L) knee to assess state of OA, rule out possible MMT -   Taping greatly reduced (B) knee pain, allowing improved gait, function, tolerance to PT activities     Pt would benefit from more consistent HEP / gym attendance to work when not in PT - need to progress to  at [employer]      Pt may benefit from ROLLATOR to allow larger strides, more confidence and improved functional ability w/ reduced fall risk    Progress strengthening /stabilization /functional activity       _________________________________________________    Manual Therapy:                 mins  73915;  Therapeutic Exercise:    15    mins  93497;     Neuromuscular Devonte:   08     mins  09314;    Therapeutic Activity:     20      mins  02334;     Ultrasound:                          mins  42301;  Iontophoresis:                     mins  93527;    Electrical Stimulation:         mins  35353 ( );  Mechanical Traction:          mins  09021  Dry Needling                       mins self-pay     Timed Treatment:   43    mins                  Total Treatment:     43    mins        Robbi Szymanski, PT  Physical Therapist  Lic # [id number]

## 2025-04-01 ENCOUNTER — TREATMENT (OUTPATIENT)
Dept: PHYSICAL THERAPY | Facility: CLINIC | Age: 80
End: 2025-04-01
Payer: MEDICARE

## 2025-04-01 DIAGNOSIS — M25.562 CHRONIC PAIN OF BOTH KNEES: ICD-10-CM

## 2025-04-01 DIAGNOSIS — M54.50 CHRONIC BILATERAL LOW BACK PAIN, UNSPECIFIED WHETHER SCIATICA PRESENT: ICD-10-CM

## 2025-04-01 DIAGNOSIS — M25.561 CHRONIC PAIN OF BOTH KNEES: ICD-10-CM

## 2025-04-01 DIAGNOSIS — Z74.09 IMPAIRED FUNCTIONAL MOBILITY, BALANCE, GAIT, AND ENDURANCE: ICD-10-CM

## 2025-04-01 DIAGNOSIS — M17.12 PRIMARY OSTEOARTHRITIS OF LEFT KNEE: Primary | ICD-10-CM

## 2025-04-01 DIAGNOSIS — G89.29 CHRONIC BILATERAL LOW BACK PAIN, UNSPECIFIED WHETHER SCIATICA PRESENT: ICD-10-CM

## 2025-04-01 DIAGNOSIS — M17.11 PRIMARY OSTEOARTHRITIS OF RIGHT KNEE: ICD-10-CM

## 2025-04-01 DIAGNOSIS — G89.29 CHRONIC PAIN OF BOTH KNEES: ICD-10-CM

## 2025-04-01 NOTE — PROGRESS NOTES
Physical Therapy Daily Note    Patient Name: Isauro Sevilla III         :  1945  Referring Physician: Bryan Mcmullen MD  Treatment Date: 2025  Date of Initial Visit: Type: THERAPY  Noted: 2024    Visit Diagnoses:    ICD-10-CM ICD-9-CM   1. Primary osteoarthritis of left knee  M17.12 715.16   2. Impaired functional mobility, balance, gait, and endurance  Z74.09 V49.89   3. Primary osteoarthritis of right knee  M17.11 715.16   4. Chronic bilateral low back pain, unspecified whether sciatica present  M54.50 724.2    G89.29 338.29   5. Chronic pain of both knees  M25.561 719.46    M25.562 338.29    G89.29      Patient seen for 33 sessions  _____________________________________________________    Subjective   Isauro Sevilla III reports: feeling stronger -     Objective   Pt ambulating w/ increased pace w/ st cane and improved wts w/ leg press and hip abd    TREATMENT:   EXERCISES:   [x]   NUSTEP Seat 11 Arms 11 Level 9 x 10 min  []   HIP ADD / BUTTERFLY STRETCHES  2 min  []   SUPINE HIP FLEXOR STRETCHES off SIDE of BED  2 min ea  [x]   SIT to STAND 2x 5 std chair (18 inches)   [x]   SIDE STEPPING 30ft ea  (SBA)  [x]   MONSTER WALK Fwd/Retro 30 ft ea  w/ SBA  [x]   RAFFAEEL LEG PRESS 240#  2x 20 (SEAT BACK MAX w/ bars lifted)  [x]   RAFFAELE HIP ABDuction 1000# 2x 15   [x]   LATERAL STEP UP w/ ABD; (hole 3 from bottom) alternating legs x 15 ea  [x]   UP/DOWN 2 FLIGHTS of STAIRS w/ HR and st cane Reciprocally - w/ SBA  []   SEATED HS CURL (seat 7) 30# x 20  [x]   SLS (requires much UE support)  R / L  [x]    BALANCE BOARD Frnt/Bk; Side/Side -  Pt holding on too much - much cuing needed to not hold on w/ UEs - 1-2 min ea  [x]   TANDEM STANDING R/L; L/R  [x]   AMBULATE TRACK x 1 laps total w/ emphasis on longer stride length and arm swing w/o AD     []   UPATED HEP and REVIEWED w/ PATIENT      NMR: Verbal and tactile cues to facilitate core, posture,  quads/VMO, glutes, hip / LE musculature statically and  dynamically. - Balance / proprioception activities -  - ;cuing for longer stride length in gait -     Functional / Therapeutic Activities:    TAPING / BRACING: K-tape to Unload PF jt (B) knees (2 layers (L) knee)  SEE EXERCISEs -      Assessment/Plan  80 yo male; L>R KNEE PAIN; (L) KNEE OA, PF jt pain/OA; (R) KNEE h/o TKA ; Multiple (R) knee surgeries -   Pt would benefit from further assessment of (L) knee to assess state of OA, rule out possible MMT -   Taping greatly reduced (B) knee pain, allowing improved gait, function, tolerance to PT activities     Pt would benefit from more consistent HEP / gym attendance to work when not in PT - need to progress to  at [facility]      Pt may benefit from ROLLATOR to allow larger strides, more confidence and improved functional ability w/ reduced fall risk    Progress strengthening /stabilization /functional activity       _________________________________________________    Manual Therapy:                 mins  41142;  Therapeutic Exercise:    15    mins  92415;     Neuromuscular Devonte:   08     mins  21582;    Therapeutic Activity:     20      mins  03298;     Ultrasound:                          mins  55235;  Iontophoresis:                     mins  27933;    Electrical Stimulation:         mins  05776 ( );  Mechanical Traction:          mins  97134  Dry Needling                       mins self-pay     Timed Treatment:   43    mins                  Total Treatment:     43    mins        Robbi Szymanski, PT  Physical Therapist  Lic # [id number]

## 2025-04-03 ENCOUNTER — TREATMENT (OUTPATIENT)
Dept: PHYSICAL THERAPY | Facility: CLINIC | Age: 80
End: 2025-04-03
Payer: MEDICARE

## 2025-04-03 DIAGNOSIS — M54.50 CHRONIC BILATERAL LOW BACK PAIN, UNSPECIFIED WHETHER SCIATICA PRESENT: ICD-10-CM

## 2025-04-03 DIAGNOSIS — M17.12 PRIMARY OSTEOARTHRITIS OF LEFT KNEE: Primary | ICD-10-CM

## 2025-04-03 DIAGNOSIS — Z74.09 IMPAIRED FUNCTIONAL MOBILITY, BALANCE, GAIT, AND ENDURANCE: ICD-10-CM

## 2025-04-03 DIAGNOSIS — M25.561 CHRONIC PAIN OF BOTH KNEES: ICD-10-CM

## 2025-04-03 DIAGNOSIS — G89.29 CHRONIC BILATERAL LOW BACK PAIN, UNSPECIFIED WHETHER SCIATICA PRESENT: ICD-10-CM

## 2025-04-03 DIAGNOSIS — G89.29 CHRONIC PAIN OF BOTH KNEES: ICD-10-CM

## 2025-04-03 DIAGNOSIS — M25.562 CHRONIC PAIN OF BOTH KNEES: ICD-10-CM

## 2025-04-03 DIAGNOSIS — M17.11 PRIMARY OSTEOARTHRITIS OF RIGHT KNEE: ICD-10-CM

## 2025-04-03 NOTE — PROGRESS NOTES
Physical Therapy Daily Note    Patient Name: Isauro Seivlla III         :  1945  Referring Physician: Bryan Mcmullen MD  Treatment Date: 4/3/2025  Date of Initial Visit: Type: THERAPY  Noted: 2024    Visit Diagnoses:    ICD-10-CM ICD-9-CM   1. Primary osteoarthritis of left knee  M17.12 715.16   2. Impaired functional mobility, balance, gait, and endurance  Z74.09 V49.89   3. Primary osteoarthritis of right knee  M17.11 715.16   4. Chronic bilateral low back pain, unspecified whether sciatica present  M54.50 724.2    G89.29 338.29   5. Chronic pain of both knees  M25.561 719.46    M25.562 338.29    G89.29      Patient seen for 34 sessions  _____________________________________________________    Subjective   Isauro Sevilla III reports: balance is an issue     Objective   Pt ambulating w/ st cane w/ very small steps/strides especially if fatigued - frequent cuing to increase strides -     TREATMENT:   EXERCISES:   [x]   NUSTEP Seat 11 Arms 11 Level 9 x 10 min  []   HIP ADD / BUTTERFLY STRETCHES  2 min  []   SUPINE HIP FLEXOR STRETCHES off SIDE of BED  2 min ea  [x]   SIT to STAND 2x 5 std chair (18 inches)   [x]   SIDE STEPPING 30ft ea  (SBA)  [x]   MONSTER WALK Fwd/Retro 30 ft ea  w/ SBA  [x]   RAFFAELE LEG PRESS 240#  2x 20 (SEAT BACK MAX w/ bars lifted)  [x]   RAFFAELE HIP ABDuction 100# 2x 15   [x]   LATERAL STEP UP w/ ABD; (hole 3 from bottom) alternating legs x 15 ea  []   UP/DOWN 2 FLIGHTS of STAIRS w/ HR and st cane Reciprocally - w/ SBA  []   SEATED HS CURL (seat 7) 30# x 20  []   SLS (requires much UE support)  R / L  []    BALANCE BOARD Frnt/Bk; Side/Side -  Pt holding on too much - much cuing needed to not hold on w/ UEs - 1-2 min ea  [x]    STANDING DF w/ ECC Lowering at wall  [x]    STEP OVER YOGA BLOCK w/ cane/ wall assist x 15  [x]   TANDEM STANDING R/L; L/R  [x]   AMBULATE TRACK x 1.5 laps total w/ emphasis on longer stride length and arm swing w/o AD     []   UPATED HEP and REVIEWED w/  PATIENT      NMR: Verbal and tactile cues to facilitate core, posture,  quads/VMO, glutes, hip / LE musculature statically and dynamically. - Balance / proprioception activities -  - ;cuing for longer stride length in gait -     Functional / Therapeutic Activities:    TAPING / BRACING: K-tape to Unload PF jt (B) knees (2 layers (L) knee)  SEE EXERCISEs -         Assessment/Plan  80 yo male; L>R KNEE PAIN; (L) KNEE OA, PF jt pain/OA; (R) KNEE h/o TKA ; Multiple (R) knee surgeries -   Pt would benefit from further assessment of (L) knee to assess state of OA, rule out possible MMT -   Taping greatly reduced (B) knee pain, allowing improved gait, function, tolerance to PT activities     Pt would benefit from more consistent HEP / gym attendance to work when not in PT - need to progress to  at [facility]      Pt would benefit from ROLLATOR to allow larger strides, more confidence and improved functional ability, increase stride length, pace w/ reduced fall risk    Progress strengthening /stabilization /functional activity       _________________________________________________    Manual Therapy:                 mins  87332;  Therapeutic Exercise:    15    mins  75746;     Neuromuscular Devonte:   08     mins  53689;    Therapeutic Activity:     20      mins  77515;     Ultrasound:                          mins  75190;  Iontophoresis:                     mins  51858;    Electrical Stimulation:         mins  64774 ( );  Mechanical Traction:          mins  67838  Dry Needling                       mins self-pay     Timed Treatment:   43    mins                  Total Treatment:     43    mins        Robbi Szymanski, PT  Physical Therapist  Lic # [id number]

## 2025-04-08 ENCOUNTER — TREATMENT (OUTPATIENT)
Dept: PHYSICAL THERAPY | Facility: CLINIC | Age: 80
End: 2025-04-08
Payer: MEDICARE

## 2025-04-08 DIAGNOSIS — M25.561 CHRONIC PAIN OF BOTH KNEES: ICD-10-CM

## 2025-04-08 DIAGNOSIS — M17.12 PRIMARY OSTEOARTHRITIS OF LEFT KNEE: Primary | ICD-10-CM

## 2025-04-08 DIAGNOSIS — M17.11 PRIMARY OSTEOARTHRITIS OF RIGHT KNEE: ICD-10-CM

## 2025-04-08 DIAGNOSIS — M25.562 CHRONIC PAIN OF BOTH KNEES: ICD-10-CM

## 2025-04-08 DIAGNOSIS — Z74.09 IMPAIRED FUNCTIONAL MOBILITY, BALANCE, GAIT, AND ENDURANCE: ICD-10-CM

## 2025-04-08 DIAGNOSIS — G89.29 CHRONIC PAIN OF BOTH KNEES: ICD-10-CM

## 2025-04-08 PROCEDURE — 97530 THERAPEUTIC ACTIVITIES: CPT | Performed by: PHYSICAL THERAPIST

## 2025-04-08 PROCEDURE — 97110 THERAPEUTIC EXERCISES: CPT | Performed by: PHYSICAL THERAPIST

## 2025-04-08 PROCEDURE — 97112 NEUROMUSCULAR REEDUCATION: CPT | Performed by: PHYSICAL THERAPIST

## 2025-04-08 NOTE — PROGRESS NOTES
Physical Therapy Daily Note    Patient Name: Isauro Sevilla III         :  1945  Referring Physician: Bryan Mcmullen MD  Treatment Date: 2025  Date of Initial Visit: Type: THERAPY  Noted: 2024    Visit Diagnoses:    ICD-10-CM ICD-9-CM   1. Primary osteoarthritis of left knee  M17.12 715.16   2. Impaired functional mobility, balance, gait, and endurance  Z74.09 V49.89   3. Primary osteoarthritis of right knee  M17.11 715.16   4. Chronic pain of both knees  M25.561 719.46    M25.562 338.29    G89.29      Patient seen for 35 sessions  _____________________________________________________    Subjective   Isauro Sevilla III reports: doesn't want to consider a Rollator because he doesn't want to look elderly -     Objective   Pt ambulating w/ st cane w/ too small stride length, reaching for wall frequently with other UE - despite cuing to correct stride length -     TREATMENT:   EXERCISES:   [x]   NUSTEP Seat 11 Arms 11 Level 9 x 10 min  []   HIP ADD / BUTTERFLY STRETCHES  2 min  []   SUPINE HIP FLEXOR STRETCHES off SIDE of BED  2 min ea  [x]   SIT to STAND 2x 5 std chair (18 inches)   [x]   SIDE STEPPING 30ft ea  (SBA)  [x]   MONSTER WALK Fwd/Retro 30 ft ea  w/ SBA  [x]   RAFFAELE LEG PRESS 240#  2x 20 (SEAT BACK MAX w/ bars lifted)  [x]   RAFFAELE HIP ABDuction 100# 2x 15   [x]   LATERAL STEP UP w/ ABD; (hole 3 from bottom) alternating legs x 15 ea  [x]   UP/DOWN 2 FLIGHTS of STAIRS w/ HR and st cane Reciprocally - w/ SBA  []   SEATED HS CURL (seat 7) 30# x 20  [x]   SLS (requires much UE support)  R / L  []    BALANCE BOARD Frnt/Bk; Side/Side -  Pt holding on too much - much cuing needed to not hold on w/ UEs - 1-2 min ea  [x]    STANDING DF w/ ECC Lowering at wall  []    STEP OVER YOGA BLOCK w/ cane/ wall assist x 15  [x]   TANDEM STANDING R/L; L/R  [x]   AMBULATE TRACK x 1.5 laps total w/ emphasis on longer stride length and arm swing w/o AD     []   UPATED HEP and REVIEWED w/ PATIENT      NMR: Verbal and  tactile cues to facilitate core, posture,  quads/VMO, glutes, hip / LE musculature statically and dynamically. - Balance / proprioception activities -  - ;cuing for longer stride length in gait -     Functional / Therapeutic Activities:    TAPING / BRACING: K-tape to Unload PF jt (B) knees (2 layers (L) knee)  SEE EXERCISEs -  Educated Pt on benefits of using a Rollator including safety, fall prevention, ability to walk faster, farther and be more active - etc       Assessment/Plan  78 yo male; L>R KNEE PAIN; (L) KNEE OA, PF jt pain/OA; (R) KNEE h/o TKA ; Multiple (R) knee surgeries -   Pt would benefit from further assessment of (L) knee to assess state of OA, rule out possible MMT -   Taping greatly reduced (B) knee pain, allowing improved gait, function, tolerance to PT activities     Pt would benefit from more consistent HEP / gym attendance to work when not in PT - need to progress to  at [facility] because PT is wrapping up -      Pt would benefit from ROLLATOR to allow larger strides, more confidence and improved functional ability, increase stride length, pace w/ reduced fall risk    Progress strengthening /stabilization /functional activity - Pt to contact  / [facility] to set up meeting -        _________________________________________________    Manual Therapy:                 mins  79885;  Therapeutic Exercise:    15    mins  34077;     Neuromuscular Devonte:   08     mins  88610;    Therapeutic Activity:     20      mins  05424;     Ultrasound:                          mins  20224;  Iontophoresis:                     mins  78605;    Electrical Stimulation:         mins  74729 ( );  Mechanical Traction:          mins  60491  Dry Needling                       mins self-pay     Timed Treatment:   43    mins                  Total Treatment:     43    mins        Robbi Szymanski, PT  Physical Therapist  Lic # [id number]

## 2025-04-10 ENCOUNTER — TREATMENT (OUTPATIENT)
Dept: PHYSICAL THERAPY | Facility: CLINIC | Age: 80
End: 2025-04-10
Payer: MEDICARE

## 2025-04-10 DIAGNOSIS — M17.11 PRIMARY OSTEOARTHRITIS OF RIGHT KNEE: ICD-10-CM

## 2025-04-10 DIAGNOSIS — G89.29 CHRONIC PAIN OF BOTH KNEES: ICD-10-CM

## 2025-04-10 DIAGNOSIS — M54.50 CHRONIC BILATERAL LOW BACK PAIN, UNSPECIFIED WHETHER SCIATICA PRESENT: ICD-10-CM

## 2025-04-10 DIAGNOSIS — M17.12 PRIMARY OSTEOARTHRITIS OF LEFT KNEE: Primary | ICD-10-CM

## 2025-04-10 DIAGNOSIS — M25.562 CHRONIC PAIN OF BOTH KNEES: ICD-10-CM

## 2025-04-10 DIAGNOSIS — G89.29 CHRONIC BILATERAL LOW BACK PAIN, UNSPECIFIED WHETHER SCIATICA PRESENT: ICD-10-CM

## 2025-04-10 DIAGNOSIS — M25.561 CHRONIC PAIN OF BOTH KNEES: ICD-10-CM

## 2025-04-10 DIAGNOSIS — Z74.09 IMPAIRED FUNCTIONAL MOBILITY, BALANCE, GAIT, AND ENDURANCE: ICD-10-CM

## 2025-04-10 NOTE — PROGRESS NOTES
Physical Therapy Daily Note    Patient Name: Isauro Sevilla III         :  1945  Referring Physician: Bryan Mcmullen MD  Treatment Date: 4/10/2025  Date of Initial Visit: Type: THERAPY  Noted: 2024    Visit Diagnoses:    ICD-10-CM ICD-9-CM   1. Primary osteoarthritis of left knee  M17.12 715.16   2. Impaired functional mobility, balance, gait, and endurance  Z74.09 V49.89   3. Primary osteoarthritis of right knee  M17.11 715.16   4. Chronic pain of both knees  M25.561 719.46    M25.562 338.29    G89.29    5. Chronic bilateral low back pain, unspecified whether sciatica present  M54.50 724.2    G89.29 338.29     Patient seen for 36 sessions  _____________________________________________________    Subjective   Isauro Sevilla III reports: no new problems - notes LBP / tightness interfering with his ability to walk -     Objective   Pt ambulating w/ st cane w/ reduced stride length and guarding w RUE    TREATMENT:   EXERCISES:   [x]   NUSTEP Seat 11 Arms 11 Level 9 x 10 min  [x]   LTR  [x]   SKC (R/L)  [x]   FIG 4 Piriformis stretch w/ foot on ground (R/L)  [x]   FIG 4 Hip ER stretch (R/L)  []   HIP ADD / BUTTERFLY STRETCHES  2 min  []   SUPINE HIP FLEXOR STRETCHES off SIDE of BED  2 min ea  [x]   SIT to STAND 2x 5 std chair (18 inches)   [x]   SIDE STEPPING 30ft ea  (SBA)  [x]   MONSTER WALK Fwd/Retro 30 ft ea  w/ SBA  [x]   RAFFAELE LEG PRESS 240#  2x 20 (SEAT BACK MAX w/ bars lifted)  [x]   RAFFAELE HIP ABDuction 100# 2x 15   [x]   LATERAL STEP UP w/ ABD; (hole 3 from bottom) alternating legs x 15 ea  [x]   UP/DOWN 2 FLIGHTS of STAIRS w/ HR and st cane Reciprocally - w/ SBA  []   SEATED HS CURL (seat 7) 30# x 20  [x]   SLS (requires much UE support)  R / L  []    BALANCE BOARD Frnt/Bk; Side/Side -  Pt holding on too much - much cuing needed to not hold on w/ UEs - 1-2 min ea  [x]    STANDING DF w/ ECC Lowering at wall  [x]    STEP OVER YOGA BLOCKs w/ cane/ wall assist x 15  Fwd and SIDE STEPPING R/L  [x]    TANDEM STANDING R/L; L/R  [x]   AMBULATE TRACK x 1.5 laps total w/ emphasis on longer stride length and arm swing w/o AD     []   UPATED HEP and REVIEWED w/ PATIENT      NMR: Verbal and tactile cues to facilitate core, posture,  quads/VMO, glutes, hip / LE musculature statically and dynamically. - Balance / proprioception activities -  - ;cuing for longer stride length in gait -     Functional / Therapeutic Activities:    TAPING / BRACING: K-tape to Unload PF jt (B) knees (2 layers (L) knee)  SEE EXERCISEs -  Educated Pt on benefits of using a Rollator including safety, fall prevention, ability to walk faster, farther and be more active - etc       Assessment/Plan  81 yo male; L>R KNEE PAIN; (L) KNEE OA, PF jt pain/OA; (R) KNEE h/o TKA ; Multiple (R) knee surgeries -   Pt would benefit from further assessment of (L) knee to assess state of OA, rule out possible MMT -   Taping greatly reduced (B) knee pain, allowing improved gait, function, tolerance to PT activities     Pt would benefit from more consistent HEP / gym attendance to work when not in PT - need to progress to  at [facility] because PT is wrapping up -      Pt would benefit from ROLLATOR to allow larger strides, more confidence and improved functional ability, increase stride length, pace w/ reduced fall risk    Progress strengthening /stabilization /functional activity       _________________________________________________    Manual Therapy:                 mins  45844;  Therapeutic Exercise:    15    mins  43376;     Neuromuscular Devonte:   10     mins  03240;    Therapeutic Activity:     20      mins  35208;     Ultrasound:                          mins  02216;  Iontophoresis:                     mins  77440;    Electrical Stimulation:         mins  97969 ( );  Mechanical Traction:          mins  54304  Dry Needling                       mins self-pay     Timed Treatment:   45    mins                  Total Treatment:     45     mins        Robbi Szymanski, PT  Physical Therapist  Lic # [id number]

## 2025-04-15 ENCOUNTER — TREATMENT (OUTPATIENT)
Dept: PHYSICAL THERAPY | Facility: CLINIC | Age: 80
End: 2025-04-15
Payer: MEDICARE

## 2025-04-15 DIAGNOSIS — M54.50 CHRONIC BILATERAL LOW BACK PAIN, UNSPECIFIED WHETHER SCIATICA PRESENT: ICD-10-CM

## 2025-04-15 DIAGNOSIS — M25.562 CHRONIC PAIN OF BOTH KNEES: ICD-10-CM

## 2025-04-15 DIAGNOSIS — G89.29 CHRONIC PAIN OF BOTH KNEES: ICD-10-CM

## 2025-04-15 DIAGNOSIS — Z74.09 IMPAIRED FUNCTIONAL MOBILITY, BALANCE, GAIT, AND ENDURANCE: ICD-10-CM

## 2025-04-15 DIAGNOSIS — M17.12 PRIMARY OSTEOARTHRITIS OF LEFT KNEE: Primary | ICD-10-CM

## 2025-04-15 DIAGNOSIS — M25.561 CHRONIC PAIN OF BOTH KNEES: ICD-10-CM

## 2025-04-15 DIAGNOSIS — G89.29 CHRONIC BILATERAL LOW BACK PAIN, UNSPECIFIED WHETHER SCIATICA PRESENT: ICD-10-CM

## 2025-04-15 DIAGNOSIS — M17.11 PRIMARY OSTEOARTHRITIS OF RIGHT KNEE: ICD-10-CM

## 2025-04-15 PROCEDURE — 97112 NEUROMUSCULAR REEDUCATION: CPT | Performed by: PHYSICAL THERAPIST

## 2025-04-15 PROCEDURE — 97110 THERAPEUTIC EXERCISES: CPT | Performed by: PHYSICAL THERAPIST

## 2025-04-15 PROCEDURE — 97530 THERAPEUTIC ACTIVITIES: CPT | Performed by: PHYSICAL THERAPIST

## 2025-04-15 NOTE — PROGRESS NOTES
Physical Therapy Daily Note  RE-ASSESSMENT    Patient Name: Isauro Sevilla III         :  1945  Referring Physician: Bryan Mcmullen MD  Treatment Date: 4/15/2025  Date of Initial Visit: No linked episodes    Visit Diagnoses:    ICD-10-CM ICD-9-CM   1. Primary osteoarthritis of left knee  M17.12 715.16   2. Impaired functional mobility, balance, gait, and endurance  Z74.09 V49.89   3. Primary osteoarthritis of right knee  M17.11 715.16   4. Chronic pain of both knees  M25.561 719.46    M25.562 338.29    G89.29    5. Chronic bilateral low back pain, unspecified whether sciatica present  M54.50 724.2    G89.29 338.29     Patient seen for Visit count could not be calculated. Make sure you are using a visit which is associated with an episode. sessions  _____________________________________________________    Subjective   Isauro Sevilla III reports: no new problems - considering a Rollator     Objective   Pt ambulating w/ st cane and at times ambulating w/ increased stride length  TUG TEST: 15 sec w/ min use of UE but w/ cane -       TREATMENT:   EXERCISES:   [x]   NUSTEP Seat 11 Arms 11 Level 9 x 10 min  []   LTR  []   SKC (R/L)  []   FIG 4 Piriformis stretch w/ foot on ground (R/L)  []   FIG 4 Hip ER stretch (R/L)  []   HIP ADD / BUTTERFLY STRETCHES  2 min  []   SUPINE HIP FLEXOR STRETCHES off SIDE of BED  2 min ea  [x]   SIT to STAND 2x 5 std chair (18 inches)   [x]   SIDE STEPPING 30ft ea  (SBA)  [x]   MONSTER WALK Fwd/Retro 30 ft ea  w/ SBA  [x]   RAFFAELE LEG PRESS 215#  2x 20 (SEAT BACK MAX w/ bars lifted)  [x]   RAFFAELE HIP ABDuction 100# 2x 15   [x]   LATERAL STEP UP w/ ABD; (hole 3 from bottom) alternating legs x 15 ea  [x]   UP/DOWN 2 FLIGHTS of STAIRS w/ HR and st cane Reciprocally - w/ SBA  []   SEATED HS CURL (seat 7) 30# x 20  [x]   SLS (requires much UE support)  R / L  []    BALANCE BOARD Frnt/Bk; Side/Side -  Pt holding on too much - much cuing needed to not hold on w/ UEs - 1-2 min ea  [x]     STANDING DF w/ ECC Lowering at wall  [x]    STEP OVER YOGA BLOCKs w/ cane/ wall assist x 15  Fwd and SIDE STEPPING R/L  [x]   TANDEM STANDING R/L; L/R  [x]   AMBULATE TRACK x 1.5 laps total w/ emphasis on longer stride length and arm swing w/o AD     []   UPATED HEP and REVIEWED w/ PATIENT      NMR: Verbal and tactile cues to facilitate core, posture,  quads/VMO, glutes, hip / LE musculature statically and dynamically. - Balance / proprioception activities -  - ;cuing for longer stride length in gait -     Functional / Therapeutic Activities:    TAPING / BRACING: K-tape to Unload PF jt (B) knees (2 layers (L) knee)  SEE EXERCISEs -  AGAIN, Educated Pt on benefits of using a Rollator including safety, fall prevention, ability to walk faster, farther and be more active - etc  Encouraged Pt to contact  Missael and if not available Bishnu to arrange personal training - at [facility] -         Assessment/Plan  81 yo male; L>R KNEE PAIN; (L) KNEE OA, PF jt pain/OA; (R) KNEE h/o TKA ; Multiple (R) knee surgeries -   Pt would benefit from further assessment of (L) knee to assess state of OA, rule out possible MMT -   Taping greatly reduced (B) knee pain, allowing improved gait, function, tolerance to PT activities     Pt would benefit from more consistent HEP / gym attendance to work when not in PT - need to progress to  at [facility] because PT is wrapping up -      Pt would benefit from ROLLATOR to allow larger strides, more confidence and improved functional ability, increase stride length, pace w/ reduced fall risk    GOAL STATUS:   Goals  Plan Goals: STGs:  1. Patient will be independent with education for symptom management, joint protection and strategies to minimize stress on affected tissues. MET  2. Pt is able to tolerate 30 min of aquatic therapy with reports of reduced pain levels after treatment for a couple of hours.   MET  3. Pt to improve 5xSTS from 18.8 sec to 16 sec. -MET on 12-6-24,   4.  Pt to improve TUG from 17.8 sec to 16 sec.  MET     LTGS:  1. Pt to improve his time on the 5X Sit to Stand test from 16 sec to 14 sec for greater ease getting in/out of the car. MET 15 sec w/ minimal use of UEs  2. Pt to improve trunk and LE strength by 1/2 to 1 MMT grade for ease of getting in/out of the therapy pool to continue with his workouts following PHYSICAL THERAPY.  MET  3. Pt to improve TUG from 16 sec to 14 sec for improved balance and mobility.  with cane - MET- 13sec w/ cane  4. Pt to be independent with progressive HEP for core and LE strength. -LAND - MET  5. Patient to be independent with aquatic exercises for balance. MET  6) Able to ambulate up/down 2 flights of stairs reciprocally w/ HR and knees w/wo tape - ABLE w/ CANE as well     Isauro would benefit from continuing his HEP and working with  for safe and full return to ADLs and hobby activities w/  appropriate AD and modifications prn - Pt has done well w/ PT, but has met a plateau and would benefit from discontinuing formal Physical Therapy and transition to  at [facility] - Pt is very familiar w/ exercises and needs motivation -     Anticipate DC next Visit to HEP and  at [facility] -        _________________________________________________    Manual Therapy:                 mins  24506;  Therapeutic Exercise:   18    mins  45372;     Neuromuscular Devonte:   05     mins  79423;    Therapeutic Activity:     20      mins  93604;     Ultrasound:                          mins  17311;  Iontophoresis:                     mins  89370;    Electrical Stimulation:         mins  09390 ( );  Mechanical Traction:          mins  64852  Dry Needling                       mins self-pay     Timed Treatment:   43    mins                  Total Treatment:     43    mins        Robbi Szymanski PT  Physical Therapist  Lic # [id number]

## 2025-04-17 ENCOUNTER — TREATMENT (OUTPATIENT)
Dept: PHYSICAL THERAPY | Facility: CLINIC | Age: 80
End: 2025-04-17
Payer: MEDICARE

## 2025-04-17 DIAGNOSIS — M25.562 CHRONIC PAIN OF BOTH KNEES: ICD-10-CM

## 2025-04-17 DIAGNOSIS — Z74.09 IMPAIRED FUNCTIONAL MOBILITY, BALANCE, GAIT, AND ENDURANCE: ICD-10-CM

## 2025-04-17 DIAGNOSIS — M25.561 CHRONIC PAIN OF BOTH KNEES: ICD-10-CM

## 2025-04-17 DIAGNOSIS — M17.11 PRIMARY OSTEOARTHRITIS OF RIGHT KNEE: ICD-10-CM

## 2025-04-17 DIAGNOSIS — G89.29 CHRONIC PAIN OF BOTH KNEES: ICD-10-CM

## 2025-04-17 DIAGNOSIS — M17.12 PRIMARY OSTEOARTHRITIS OF LEFT KNEE: Primary | ICD-10-CM

## 2025-04-17 NOTE — PROGRESS NOTES
Physical Therapy Daily Note  DISCHARGE     Patient Name: Isauro Sevilla III         :  1945  Referring Physician: Bryan Mcmullen MD  Treatment Date: 2025  Date of Initial Visit: Type: THERAPY  Noted: 2024    Visit Diagnoses:    ICD-10-CM ICD-9-CM   1. Primary osteoarthritis of left knee  M17.12 715.16   2. Impaired functional mobility, balance, gait, and endurance  Z74.09 V49.89   3. Primary osteoarthritis of right knee  M17.11 715.16   4. Chronic pain of both knees  M25.561 719.46    M25.562 338.29    G89.29      Patient seen for 37 sessions  _____________________________________________________    Subjective   Isauro Sevilla III reports: walking better overall, but is going to look into a ROLLATOR - wants to work out more regularly - will contact Trainer -     Objective   Pt ambulating w/ st cane - at times takes longer strides, but quickly returns to small almost shuffling gait -   Improved stair endurance w/ st cane and HR -   Pt demonstrating improved endurance to PT activities -     TREATMENT:   EXERCISES:   [x]   NUSTEP Seat 11 Arms 11 Level 9 x 10 min  []   LTR  []   SKC (R/L)  []   FIG 4 Piriformis stretch w/ foot on ground (R/L)  []   FIG 4 Hip ER stretch (R/L)  []   HIP ADD / BUTTERFLY STRETCHES  2 min  []   SUPINE HIP FLEXOR STRETCHES off SIDE of BED  2 min ea  [x]   SIT to STAND 2x 5 std chair (18 inches)   []   SIDE STEPPING 30ft ea  (SBA)  []   MONSTER WALK Fwd/Retro 30 ft ea  w/ SBA  [x]   RAFFAELE LEG PRESS 215#  2x 20 (SEAT BACK MAX w/ bars lifted)  [x]   RAFFAELE HIP ABDuction 100# 2x 15   [x]   LATERAL STEP UP w/ ABD; (hole 3 from bottom) alternating legs x 15 ea  [x]   UP/DOWN 2 FLIGHTS of STAIRS w/ HR and st cane Reciprocally - w/ SBA  [x]   SEATED HS CURL (seat 7) 30# x 20  []   SLS (requires much UE support)  R / L  []    BALANCE BOARD Frnt/Bk; Side/Side -  Pt holding on too much - much cuing needed to not hold on w/ UEs - 1-2 min ea  [x]    STANDING DF w/ ECC Lowering at  wall  []    STEP OVER YOGA BLOCKs w/ cane/ wall assist x 15  Fwd and SIDE STEPPING R/L  []   TANDEM STANDING R/L; L/R  [x]   AMBULATE TRACK x 1.5 laps total w/ emphasis on longer stride length and arm swing w/o AD     [x]   UPATED HEP and REVIEWED and MILESTONT EXERCISES w/ PATIENT      NMR: Verbal and tactile cues to facilitate core, posture,  quads/VMO, glutes, hip / LE musculature statically and dynamically. - Balance / proprioception activities -  - ;cuing for longer stride length in gait -     Functional / Therapeutic Activities:    TAPING / BRACING: K-tape to Unload PF jt (B) knees (2 layers (L) knee)  SEE EXERCISEs -  Reviewed benefits of using a Rollator including safety, fall prevention, ability to walk faster, farther and be more active - etc  Encouraged Pt to contact  Missael and if not available Bishnu to arrange personal training - at [facility] -        Assessment/Plan  79 yo male; L>R KNEE PAIN; (L) KNEE OA, PF jt pain/OA; (R) KNEE h/o TKA ; Multiple (R) knee surgeries -   Pt would benefit from further assessment of (L) knee to assess state of OA, rule out possible MMT -   Taping greatly reduced (B) knee pain, allowing improved gait, function, tolerance to PT activities     Pt would benefit from more consistent HEP / gym attendance to work when not in PT - and progress to  at [facility] because PT is wrapping up -      Pt would benefit from ROLLATOR to allow larger strides, more confidence and improved functional ability, increase stride length, pace w/ reduced fall risk    Mr. Sevilla would benefit from continuing his HEP, working in the pool and [facility] and discontinue formal Physical Therapy at this time -     DC PT to HEP       _________________________________________________    Manual Therapy:                 mins  23431;  Therapeutic Exercise:    15    mins  99515;     Neuromuscular Devonte:   08     mins  37396;    Therapeutic Activity:     22      mins   54160;     Ultrasound:                          mins  57407;  Iontophoresis:                     mins  80238;    Electrical Stimulation:         mins  61771 ( );  Mechanical Traction:          mins  43508  Dry Needling                       mins self-pay     Timed Treatment:   45    mins                  Total Treatment:     45    mins        Rbobi Szymanski, PT  Physical Therapist  Lic # [id number]       ANA CORRAL IIIa    EXERCISES: For MILESTONE  []  NUSTEP Seat 11 Arms 11 Level 9 x 10 min  []  SIT to STAND 2x 5 std chair (18 inches)   []  SIDE STEPPING 30ft ea  (SBA)  []  MONSTER WALK Fwd/Retro 30 ft ea  w/ SBA  []  RAFFAELE LEG PRESS 215#  2x 20 (SEAT BACK MAX w/ bars lifted)  []  RAFFAELE HIP ABDuction 100# 2x 15   []  FWD RUNNER STEP UP (hole 4 from bottom) alternating legs x 15 ea  []  LATERAL STEP UP w/ ABD; (hole 3 from bottom) x 15 ea leg  []  UP/DOWN 2 FLIGHTS of STAIRS w/ HR and st cane Reciprocally - w/ SBA  []  SEATED HS CURL (seat 7) 30# 2x15  []  SLS (requires much UE support)  R / L  []   BALANCE BOARD Frnt/Bk; Side/Side -  Pt holding on too much - much cuing needed to not hold on w/ UEs - 1-2 min ea  []   STANDING TOE RAISES w/ SLOW Lowering standing with back against the wall  []   STEP OVER yellow hurdles w/ cane/ wall assist x 15  Fwd and SIDE STEPPING R/L  (Stand By Assist (SBA))  []  TANDEM STANDING R/L; L/R  1 min each  []  AMBULATE TRACK x 1.5 - 2 laps total w/ emphasis on longer stride length and arm swing w/o AD